# Patient Record
Sex: MALE | Race: WHITE | Employment: FULL TIME | URBAN - METROPOLITAN AREA
[De-identification: names, ages, dates, MRNs, and addresses within clinical notes are randomized per-mention and may not be internally consistent; named-entity substitution may affect disease eponyms.]

---

## 2017-08-14 DIAGNOSIS — I25.10 CORONARY ARTERY DISEASE INVOLVING NATIVE CORONARY ARTERY OF NATIVE HEART WITHOUT ANGINA PECTORIS: Primary | ICD-10-CM

## 2017-08-19 ENCOUNTER — HOSPITAL ENCOUNTER (OUTPATIENT)
Dept: OTHER | Age: 52
Discharge: HOME | End: 2017-08-19
Attending: INTERNAL MEDICINE | Admitting: INTERNAL MEDICINE

## 2017-08-19 LAB
A/G RATIO: 1.3 (ref 1.1–2.2)
ALBUMIN SERPL-MCNC: 3.9 G/DL (ref 3.4–5)
ALP BLD-CCNC: 65 U/L (ref 40–129)
ALT SERPL-CCNC: 33 U/L (ref 10–40)
ANION GAP SERPL CALCULATED.3IONS-SCNC: 15 MMOL/L (ref 3–16)
AST SERPL-CCNC: 22 U/L (ref 15–37)
BILIRUB SERPL-MCNC: 0.5 MG/DL (ref 0–1)
BUN BLDV-MCNC: 16 MG/DL (ref 7–20)
CALCIUM SERPL-MCNC: 9 MG/DL (ref 8.3–10.6)
CHLORIDE BLD-SCNC: 102 MMOL/L (ref 99–110)
CHOLESTEROL, TOTAL: 147 MG/DL (ref 0–199)
CO2: 25 MMOL/L (ref 21–32)
CREAT SERPL-MCNC: 0.9 MG/DL (ref 0.9–1.3)
GFR AFRICAN AMERICAN: >60
GFR NON-AFRICAN AMERICAN: >60
GLOBULIN: 3 G/DL
GLUCOSE BLD-MCNC: 120 MG/DL (ref 70–99)
HDLC SERPL-MCNC: 37 MG/DL (ref 40–60)
LDL CHOLESTEROL CALCULATED: 66 MG/DL
POTASSIUM SERPL-SCNC: 4 MMOL/L (ref 3.5–5.1)
SODIUM BLD-SCNC: 142 MMOL/L (ref 136–145)
TOTAL PROTEIN: 6.9 G/DL (ref 6.4–8.2)
TRIGL SERPL-MCNC: 218 MG/DL (ref 0–150)
VLDLC SERPL CALC-MCNC: 44 MG/DL

## 2017-08-20 LAB
ESTIMATED AVERAGE GLUCOSE: 131.2 MG/DL
HBA1C MFR BLD: 6.2 %

## 2017-08-21 ENCOUNTER — TELEPHONE (OUTPATIENT)
Dept: CARDIOLOGY CLINIC | Age: 52
End: 2017-08-21

## 2017-09-01 ENCOUNTER — OFFICE VISIT (OUTPATIENT)
Dept: CARDIOLOGY CLINIC | Age: 52
End: 2017-09-01

## 2017-09-01 VITALS
WEIGHT: 262 LBS | BODY MASS INDEX: 37.51 KG/M2 | SYSTOLIC BLOOD PRESSURE: 166 MMHG | OXYGEN SATURATION: 98 % | HEIGHT: 70 IN | HEART RATE: 88 BPM | DIASTOLIC BLOOD PRESSURE: 100 MMHG

## 2017-09-01 DIAGNOSIS — I25.10 CORONARY ARTERY DISEASE INVOLVING NATIVE CORONARY ARTERY OF NATIVE HEART WITHOUT ANGINA PECTORIS: ICD-10-CM

## 2017-09-01 DIAGNOSIS — I25.2 HISTORY OF NON-ST ELEVATION MYOCARDIAL INFARCTION (NSTEMI): ICD-10-CM

## 2017-09-01 DIAGNOSIS — I10 ESSENTIAL HYPERTENSION, BENIGN: Primary | ICD-10-CM

## 2017-09-01 PROCEDURE — 99214 OFFICE O/P EST MOD 30 MIN: CPT | Performed by: INTERNAL MEDICINE

## 2017-09-01 RX ORDER — HYDROCHLOROTHIAZIDE 25 MG/1
25 TABLET ORAL DAILY
Qty: 30 TABLET | Refills: 3 | Status: SHIPPED | OUTPATIENT
Start: 2017-09-01 | End: 2018-01-06 | Stop reason: SDUPTHER

## 2017-09-25 DIAGNOSIS — I25.10 CORONARY ARTERY DISEASE INVOLVING NATIVE CORONARY ARTERY OF NATIVE HEART WITHOUT ANGINA PECTORIS: ICD-10-CM

## 2017-09-25 RX ORDER — METOPROLOL SUCCINATE 100 MG/1
100 TABLET, EXTENDED RELEASE ORAL DAILY
Qty: 30 TABLET | Refills: 11 | Status: SHIPPED | OUTPATIENT
Start: 2017-09-25 | End: 2018-10-05 | Stop reason: SDUPTHER

## 2017-09-25 RX ORDER — ATORVASTATIN CALCIUM 80 MG/1
80 TABLET, FILM COATED ORAL DAILY
Qty: 30 TABLET | Refills: 11 | Status: SHIPPED | OUTPATIENT
Start: 2017-09-25 | End: 2018-10-05 | Stop reason: SDUPTHER

## 2017-09-25 RX ORDER — LOSARTAN POTASSIUM 100 MG/1
100 TABLET ORAL DAILY
Qty: 30 TABLET | Refills: 11 | Status: SHIPPED | OUTPATIENT
Start: 2017-09-25 | End: 2018-10-05 | Stop reason: SDUPTHER

## 2018-01-08 RX ORDER — HYDROCHLOROTHIAZIDE 25 MG/1
TABLET ORAL
Qty: 90 TABLET | Refills: 2 | Status: SHIPPED | OUTPATIENT
Start: 2018-01-08 | End: 2018-10-05 | Stop reason: SDUPTHER

## 2018-10-05 ENCOUNTER — TELEPHONE (OUTPATIENT)
Dept: CARDIOLOGY CLINIC | Age: 53
End: 2018-10-05

## 2018-10-05 DIAGNOSIS — I25.10 CORONARY ARTERY DISEASE INVOLVING NATIVE CORONARY ARTERY OF NATIVE HEART WITHOUT ANGINA PECTORIS: ICD-10-CM

## 2018-10-05 RX ORDER — LOSARTAN POTASSIUM 100 MG/1
100 TABLET ORAL DAILY
Qty: 30 TABLET | Refills: 0 | Status: SHIPPED | OUTPATIENT
Start: 2018-10-05 | End: 2018-10-06 | Stop reason: SDUPTHER

## 2018-10-05 RX ORDER — ASPIRIN 81 MG/1
81 TABLET, CHEWABLE ORAL DAILY
Qty: 30 TABLET | Refills: 0 | Status: SHIPPED | OUTPATIENT
Start: 2018-10-05 | End: 2018-10-26 | Stop reason: SDUPTHER

## 2018-10-05 RX ORDER — METOPROLOL SUCCINATE 100 MG/1
100 TABLET, EXTENDED RELEASE ORAL DAILY
Qty: 30 TABLET | Refills: 0 | Status: SHIPPED | OUTPATIENT
Start: 2018-10-05 | End: 2018-10-06 | Stop reason: SDUPTHER

## 2018-10-05 RX ORDER — HYDROCHLOROTHIAZIDE 25 MG/1
25 TABLET ORAL DAILY
Qty: 30 TABLET | Refills: 0 | Status: SHIPPED | OUTPATIENT
Start: 2018-10-05 | End: 2018-10-26 | Stop reason: SDUPTHER

## 2018-10-05 RX ORDER — ATORVASTATIN CALCIUM 80 MG/1
80 TABLET, FILM COATED ORAL DAILY
Qty: 30 TABLET | Refills: 0 | Status: SHIPPED | OUTPATIENT
Start: 2018-10-05 | End: 2018-10-06 | Stop reason: SDUPTHER

## 2018-10-06 DIAGNOSIS — I25.10 CORONARY ARTERY DISEASE INVOLVING NATIVE CORONARY ARTERY OF NATIVE HEART WITHOUT ANGINA PECTORIS: ICD-10-CM

## 2018-10-08 ENCOUNTER — TELEPHONE (OUTPATIENT)
Dept: CARDIOLOGY CLINIC | Age: 53
End: 2018-10-08

## 2018-10-08 RX ORDER — METOPROLOL SUCCINATE 100 MG/1
TABLET, EXTENDED RELEASE ORAL
Qty: 30 TABLET | Refills: 0 | Status: SHIPPED | OUTPATIENT
Start: 2018-10-08 | End: 2018-10-11 | Stop reason: SDUPTHER

## 2018-10-08 RX ORDER — ATORVASTATIN CALCIUM 80 MG/1
TABLET, FILM COATED ORAL
Qty: 30 TABLET | Refills: 0 | Status: SHIPPED | OUTPATIENT
Start: 2018-10-08 | End: 2018-10-26 | Stop reason: SDUPTHER

## 2018-10-08 RX ORDER — LOSARTAN POTASSIUM 100 MG/1
TABLET ORAL
Qty: 30 TABLET | Refills: 0 | Status: SHIPPED | OUTPATIENT
Start: 2018-10-08 | End: 2018-10-11 | Stop reason: SDUPTHER

## 2018-10-11 DIAGNOSIS — I25.10 CORONARY ARTERY DISEASE INVOLVING NATIVE CORONARY ARTERY OF NATIVE HEART WITHOUT ANGINA PECTORIS: ICD-10-CM

## 2018-10-11 RX ORDER — LOSARTAN POTASSIUM 100 MG/1
TABLET ORAL
Qty: 30 TABLET | Refills: 0 | Status: SHIPPED | OUTPATIENT
Start: 2018-10-11 | End: 2018-10-26 | Stop reason: SDUPTHER

## 2018-10-11 RX ORDER — HYDROCHLOROTHIAZIDE 25 MG/1
TABLET ORAL
Qty: 30 TABLET | Refills: 0 | Status: SHIPPED | OUTPATIENT
Start: 2018-10-11 | End: 2018-10-26 | Stop reason: SDUPTHER

## 2018-10-11 RX ORDER — METOPROLOL SUCCINATE 100 MG/1
TABLET, EXTENDED RELEASE ORAL
Qty: 30 TABLET | Refills: 0 | Status: SHIPPED | OUTPATIENT
Start: 2018-10-11 | End: 2018-10-26 | Stop reason: SDUPTHER

## 2018-10-20 ENCOUNTER — HOSPITAL ENCOUNTER (OUTPATIENT)
Age: 53
Discharge: HOME | End: 2018-10-20
Payer: COMMERCIAL

## 2018-10-22 ENCOUNTER — TELEPHONE (OUTPATIENT)
Dept: CARDIOLOGY CLINIC | Age: 53
End: 2018-10-22

## 2018-10-25 DIAGNOSIS — E78.2 MIXED HYPERLIPIDEMIA: ICD-10-CM

## 2018-10-25 DIAGNOSIS — Z13.1 DIABETES MELLITUS SCREENING: ICD-10-CM

## 2018-10-25 DIAGNOSIS — I25.10 CORONARY ARTERY DISEASE INVOLVING NATIVE CORONARY ARTERY OF NATIVE HEART WITHOUT ANGINA PECTORIS: Primary | ICD-10-CM

## 2018-10-26 ENCOUNTER — HOSPITAL ENCOUNTER (OUTPATIENT)
Age: 53
Discharge: HOME | End: 2018-10-26
Payer: COMMERCIAL

## 2018-10-26 ENCOUNTER — OFFICE VISIT (OUTPATIENT)
Dept: CARDIOLOGY CLINIC | Age: 53
End: 2018-10-26
Payer: COMMERCIAL

## 2018-10-26 ENCOUNTER — TELEPHONE (OUTPATIENT)
Dept: CARDIOLOGY CLINIC | Age: 53
End: 2018-10-26

## 2018-10-26 VITALS
DIASTOLIC BLOOD PRESSURE: 82 MMHG | OXYGEN SATURATION: 98 % | WEIGHT: 260.6 LBS | HEIGHT: 70 IN | BODY MASS INDEX: 37.31 KG/M2 | SYSTOLIC BLOOD PRESSURE: 128 MMHG | HEART RATE: 90 BPM

## 2018-10-26 DIAGNOSIS — Z13.1 DIABETES MELLITUS SCREENING: ICD-10-CM

## 2018-10-26 DIAGNOSIS — I10 ESSENTIAL HYPERTENSION, BENIGN: ICD-10-CM

## 2018-10-26 DIAGNOSIS — I25.10 CORONARY ARTERY DISEASE INVOLVING NATIVE CORONARY ARTERY OF NATIVE HEART WITHOUT ANGINA PECTORIS: Primary | ICD-10-CM

## 2018-10-26 DIAGNOSIS — E78.2 MIXED HYPERLIPIDEMIA: ICD-10-CM

## 2018-10-26 DIAGNOSIS — I25.10 CORONARY ARTERY DISEASE INVOLVING NATIVE CORONARY ARTERY OF NATIVE HEART WITHOUT ANGINA PECTORIS: ICD-10-CM

## 2018-10-26 LAB
A/G RATIO: 1.3 (ref 1.1–2.2)
ALBUMIN SERPL-MCNC: 4.2 G/DL (ref 3.4–5)
ALP BLD-CCNC: 72 U/L (ref 40–129)
ALT SERPL-CCNC: 30 U/L (ref 10–40)
ANION GAP SERPL CALCULATED.3IONS-SCNC: 16 MMOL/L (ref 3–16)
AST SERPL-CCNC: 24 U/L (ref 15–37)
BILIRUB SERPL-MCNC: 0.5 MG/DL (ref 0–1)
BUN BLDV-MCNC: 14 MG/DL (ref 7–20)
CALCIUM SERPL-MCNC: 9.9 MG/DL (ref 8.3–10.6)
CHLORIDE BLD-SCNC: 101 MMOL/L (ref 99–110)
CHOLESTEROL, TOTAL: 142 MG/DL (ref 0–199)
CO2: 26 MMOL/L (ref 21–32)
CREAT SERPL-MCNC: 1 MG/DL (ref 0.9–1.3)
ESTIMATED AVERAGE GLUCOSE: 142.7 MG/DL
GFR AFRICAN AMERICAN: >60
GFR NON-AFRICAN AMERICAN: >60
GLOBULIN: 3.2 G/DL
GLUCOSE BLD-MCNC: 135 MG/DL (ref 70–99)
HBA1C MFR BLD: 6.6 %
HDLC SERPL-MCNC: 35 MG/DL (ref 40–60)
LDL CHOLESTEROL CALCULATED: 75 MG/DL
POTASSIUM SERPL-SCNC: 3.7 MMOL/L (ref 3.5–5.1)
SODIUM BLD-SCNC: 143 MMOL/L (ref 136–145)
TOTAL PROTEIN: 7.4 G/DL (ref 6.4–8.2)
TRIGL SERPL-MCNC: 162 MG/DL (ref 0–150)
VLDLC SERPL CALC-MCNC: 32 MG/DL

## 2018-10-26 PROCEDURE — 83036 HEMOGLOBIN GLYCOSYLATED A1C: CPT

## 2018-10-26 PROCEDURE — 36415 COLL VENOUS BLD VENIPUNCTURE: CPT

## 2018-10-26 PROCEDURE — 99214 OFFICE O/P EST MOD 30 MIN: CPT | Performed by: INTERNAL MEDICINE

## 2018-10-26 PROCEDURE — 80053 COMPREHEN METABOLIC PANEL: CPT

## 2018-10-26 PROCEDURE — 80061 LIPID PANEL: CPT

## 2018-10-26 RX ORDER — ATORVASTATIN CALCIUM 80 MG/1
TABLET, FILM COATED ORAL
Qty: 90 TABLET | Refills: 3 | Status: SHIPPED | OUTPATIENT
Start: 2018-10-26 | End: 2019-10-14 | Stop reason: SDUPTHER

## 2018-10-26 RX ORDER — ASPIRIN 81 MG/1
81 TABLET, CHEWABLE ORAL DAILY
Qty: 90 TABLET | Refills: 3 | Status: SHIPPED | OUTPATIENT
Start: 2018-10-26 | End: 2019-10-14 | Stop reason: SDUPTHER

## 2018-10-26 RX ORDER — HYDROCHLOROTHIAZIDE 25 MG/1
25 TABLET ORAL DAILY
Qty: 90 TABLET | Refills: 3 | Status: SHIPPED | OUTPATIENT
Start: 2018-10-26 | End: 2019-10-14 | Stop reason: SDUPTHER

## 2018-10-26 RX ORDER — METOPROLOL SUCCINATE 100 MG/1
TABLET, EXTENDED RELEASE ORAL
Qty: 90 TABLET | Refills: 3 | Status: SHIPPED | OUTPATIENT
Start: 2018-10-26 | End: 2019-10-14 | Stop reason: SDUPTHER

## 2018-10-26 RX ORDER — LOSARTAN POTASSIUM 100 MG/1
TABLET ORAL
Qty: 90 TABLET | Refills: 3 | Status: SHIPPED | OUTPATIENT
Start: 2018-10-26 | End: 2019-10-14 | Stop reason: SDUPTHER

## 2018-10-26 NOTE — PROGRESS NOTES
not use drugs. Family History:  No evidence for sudden cardiac death or premature CAD    Home Medications:  Reviewed and are listed in nursing record. and/or listed below  Current Outpatient Prescriptions   Medication Sig Dispense Refill    metoprolol succinate (TOPROL XL) 100 MG extended release tablet TAKE 1 TABLET BY MOUTH ONE TIME A DAY  30 tablet 0    losartan (COZAAR) 100 MG tablet TAKE 1 TABLET BY MOUTH ONE TIME A DAY  30 tablet 0    atorvastatin (LIPITOR) 80 MG tablet TAKE 1 TABLET BY MOUTH ONE TIME A DAY  30 tablet 0    aspirin 81 MG chewable tablet Take 1 tablet by mouth daily (Patient taking differently: Take 81 mg by mouth ) 30 tablet 0    hydrochlorothiazide (HYDRODIURIL) 25 MG tablet Take 1 tablet by mouth daily 30 tablet 0     No current facility-administered medications for this visit. Allergies:  Patient has no known allergies. Review of Systems:   All 14 point review of symptoms completed.  Pertinent positives identified in the HPI, all other review of symptoms negative as below.     Review of Systems - History obtained from the patient  General ROS: negative for - chills, fever or night sweats  Psychological ROS: negative for - disorientation or hallucinations  Ophthalmic ROS: negative for - dry eyes, eye pain or loss of vision  ENT ROS: negative for - nasal discharge or sore throat  Allergy and Immunology ROS: negative for - hives or itchy/watery eyes  Hematological and Lymphatic ROS: negative for - jaundice or night sweats  Endocrine ROS: negative for - mood swings or temperature intolerance  Breast ROS: deferred  Respiratory ROS: negative for - hemoptysis or stridor  Cardiovascular ROS: negative for - chest pain, dyspnea on exertion or palpitations  Gastrointestinal ROS: no abdominal pain, change in bowel habits, or black or bloody stools  Genito-Urinary ROS: no dysuria, trouble voiding, or hematuria  Musculoskeletal ROS: negative for - gait disturbance, joint pain or joint PROT 8.3 08/06/2011    CALCIUM 9.9 10/26/2018    BILITOT 0.5 10/26/2018    ALKPHOS 72 10/26/2018    AST 24 10/26/2018    ALT 30 10/26/2018     PT/INR:    No components found for: PTPATIENT,  PTINR  Lab Results   Component Value Date    CKTOTAL 922 (H) 06/28/2011    TROPONINI 17.065 (HH) 06/29/2011     No components found for: CHLPL  Lab Results   Component Value Date    TRIG 162 (H) 10/26/2018    TRIG 218 (H) 08/19/2017    TRIG 144 08/20/2016     Lab Results   Component Value Date    HDL 35 (L) 10/26/2018    HDL 37 (L) 08/19/2017    HDL 37 (L) 08/20/2016     Lab Results   Component Value Date    LDLCALC 75 10/26/2018    LDLCALC 66 08/19/2017    LDLCALC 68 08/20/2016     Lab Results   Component Value Date    LABVLDL 32 10/26/2018    LABVLDL 44 08/19/2017    LABVLDL 29 08/20/2016     Lab Results   Component Value Date    ALT 30 10/26/2018    AST 24 10/26/2018    ALKPHOS 72 10/26/2018    BILITOT 0.5 10/26/2018           Assessment:  47 y.o.   1. Hypertension    ~BP: (128)/(82)    2. Coronary artery disease   ~mild CAD and normal LVEF 2011   ~stable  3. Alcohol use   ~ongoing  4. Hyperlipidemia   ~stable  5. Obesity   ~Body mass index is 37.39 kg/m². ~encourage weight loss with daily exercise and healthy eating    Plan:  1. I recommend that the patient continue their currently prescribed medications. Their drug modifiable risk factors appear to be well controlled. I will continue to address the need/dosing of medications in future visits. 2. The patient was seen for >25 minutes. >50% of the time was devoted to giving the patient detailed instructions instructions on addressing diet, regular exercise, weight control, smoking abstention, medication compliance, and stress minimization. The patient was provided written and verbal instructions regarding risk factor modification. 3. Lab orders placed for next year CMP, Lipids and Hemoglobin A1C.   4. Establish care with a primary care physician.     5. Follow up with

## 2019-10-14 DIAGNOSIS — I25.10 CORONARY ARTERY DISEASE INVOLVING NATIVE CORONARY ARTERY OF NATIVE HEART WITHOUT ANGINA PECTORIS: ICD-10-CM

## 2019-10-14 RX ORDER — HYDROCHLOROTHIAZIDE 25 MG/1
25 TABLET ORAL DAILY
Qty: 90 TABLET | Refills: 0 | Status: SHIPPED | OUTPATIENT
Start: 2019-10-14 | End: 2019-11-22 | Stop reason: SDUPTHER

## 2019-10-14 RX ORDER — ATORVASTATIN CALCIUM 80 MG/1
TABLET, FILM COATED ORAL
Qty: 90 TABLET | Refills: 0 | Status: SHIPPED | OUTPATIENT
Start: 2019-10-14 | End: 2019-11-22 | Stop reason: SDUPTHER

## 2019-10-14 RX ORDER — METOPROLOL SUCCINATE 100 MG/1
TABLET, EXTENDED RELEASE ORAL
Qty: 90 TABLET | Refills: 0 | Status: SHIPPED | OUTPATIENT
Start: 2019-10-14 | End: 2019-11-22 | Stop reason: SDUPTHER

## 2019-10-14 RX ORDER — LOSARTAN POTASSIUM 100 MG/1
TABLET ORAL
Qty: 90 TABLET | Refills: 0 | Status: SHIPPED | OUTPATIENT
Start: 2019-10-14 | End: 2019-11-22 | Stop reason: SDUPTHER

## 2019-10-14 RX ORDER — ASPIRIN 81 MG/1
81 TABLET, CHEWABLE ORAL DAILY
Qty: 90 TABLET | Refills: 0 | Status: SHIPPED | OUTPATIENT
Start: 2019-10-14 | End: 2019-11-22 | Stop reason: SDUPTHER

## 2019-11-14 DIAGNOSIS — I10 ESSENTIAL HYPERTENSION, BENIGN: Primary | ICD-10-CM

## 2019-11-14 DIAGNOSIS — I25.2 HISTORY OF NON-ST ELEVATION MYOCARDIAL INFARCTION (NSTEMI): ICD-10-CM

## 2019-11-16 ENCOUNTER — HOSPITAL ENCOUNTER (OUTPATIENT)
Age: 54
Discharge: HOME | End: 2019-11-16
Payer: COMMERCIAL

## 2019-11-16 DIAGNOSIS — I25.2 HISTORY OF NON-ST ELEVATION MYOCARDIAL INFARCTION (NSTEMI): ICD-10-CM

## 2019-11-16 DIAGNOSIS — I10 ESSENTIAL HYPERTENSION, BENIGN: ICD-10-CM

## 2019-11-16 LAB
A/G RATIO: 1.3 (ref 1.1–2.2)
ALBUMIN SERPL-MCNC: 4.2 G/DL (ref 3.4–5)
ALP BLD-CCNC: 76 U/L (ref 40–129)
ALT SERPL-CCNC: 28 U/L (ref 10–40)
ANION GAP SERPL CALCULATED.3IONS-SCNC: 13 MMOL/L (ref 3–16)
AST SERPL-CCNC: 24 U/L (ref 15–37)
BILIRUB SERPL-MCNC: 0.5 MG/DL (ref 0–1)
BUN BLDV-MCNC: 19 MG/DL (ref 7–20)
CALCIUM SERPL-MCNC: 9.9 MG/DL (ref 8.3–10.6)
CHLORIDE BLD-SCNC: 99 MMOL/L (ref 99–110)
CHOLESTEROL, TOTAL: 126 MG/DL (ref 0–199)
CO2: 27 MMOL/L (ref 21–32)
CREAT SERPL-MCNC: 1 MG/DL (ref 0.9–1.3)
GFR AFRICAN AMERICAN: >60
GFR NON-AFRICAN AMERICAN: >60
GLOBULIN: 3.3 G/DL
GLUCOSE BLD-MCNC: 143 MG/DL (ref 70–99)
HDLC SERPL-MCNC: 30 MG/DL (ref 40–60)
LDL CHOLESTEROL CALCULATED: 55 MG/DL
POTASSIUM SERPL-SCNC: 4 MMOL/L (ref 3.5–5.1)
SODIUM BLD-SCNC: 139 MMOL/L (ref 136–145)
TOTAL PROTEIN: 7.5 G/DL (ref 6.4–8.2)
TRIGL SERPL-MCNC: 205 MG/DL (ref 0–150)
VLDLC SERPL CALC-MCNC: 41 MG/DL

## 2019-11-16 PROCEDURE — 36415 COLL VENOUS BLD VENIPUNCTURE: CPT

## 2019-11-16 PROCEDURE — 80061 LIPID PANEL: CPT

## 2019-11-16 PROCEDURE — 83036 HEMOGLOBIN GLYCOSYLATED A1C: CPT

## 2019-11-16 PROCEDURE — 80053 COMPREHEN METABOLIC PANEL: CPT

## 2019-11-17 LAB
ESTIMATED AVERAGE GLUCOSE: 154.2 MG/DL
HBA1C MFR BLD: 7 %

## 2019-11-19 ENCOUNTER — TELEPHONE (OUTPATIENT)
Dept: CARDIOLOGY CLINIC | Age: 54
End: 2019-11-19

## 2019-11-22 ENCOUNTER — OFFICE VISIT (OUTPATIENT)
Dept: CARDIOLOGY CLINIC | Age: 54
End: 2019-11-22
Payer: COMMERCIAL

## 2019-11-22 VITALS
HEART RATE: 95 BPM | DIASTOLIC BLOOD PRESSURE: 78 MMHG | BODY MASS INDEX: 37.74 KG/M2 | WEIGHT: 263.6 LBS | OXYGEN SATURATION: 98 % | SYSTOLIC BLOOD PRESSURE: 110 MMHG | HEIGHT: 70 IN

## 2019-11-22 DIAGNOSIS — I10 ESSENTIAL HYPERTENSION, BENIGN: ICD-10-CM

## 2019-11-22 DIAGNOSIS — I25.10 CORONARY ARTERY DISEASE INVOLVING NATIVE CORONARY ARTERY OF NATIVE HEART WITHOUT ANGINA PECTORIS: Primary | ICD-10-CM

## 2019-11-22 PROCEDURE — 99214 OFFICE O/P EST MOD 30 MIN: CPT | Performed by: INTERNAL MEDICINE

## 2019-11-22 RX ORDER — ASPIRIN 81 MG/1
81 TABLET, CHEWABLE ORAL DAILY
Qty: 90 TABLET | Refills: 3 | Status: SHIPPED | OUTPATIENT
Start: 2019-11-22 | End: 2020-02-11

## 2019-11-22 RX ORDER — HYDROCHLOROTHIAZIDE 25 MG/1
25 TABLET ORAL DAILY
Qty: 90 TABLET | Refills: 3 | Status: SHIPPED | OUTPATIENT
Start: 2019-11-22 | End: 2020-02-11

## 2019-11-22 RX ORDER — ATORVASTATIN CALCIUM 80 MG/1
TABLET, FILM COATED ORAL
Qty: 90 TABLET | Refills: 3 | Status: SHIPPED | OUTPATIENT
Start: 2019-11-22 | End: 2020-02-11

## 2019-11-22 RX ORDER — LOSARTAN POTASSIUM 100 MG/1
TABLET ORAL
Qty: 90 TABLET | Refills: 3 | Status: SHIPPED | OUTPATIENT
Start: 2019-11-22 | End: 2020-02-11

## 2019-11-22 RX ORDER — METOPROLOL SUCCINATE 100 MG/1
TABLET, EXTENDED RELEASE ORAL
Qty: 90 TABLET | Refills: 3 | Status: SHIPPED | OUTPATIENT
Start: 2019-11-22 | End: 2020-02-11

## 2020-02-11 RX ORDER — ASPIRIN 81 MG/1
TABLET, CHEWABLE ORAL
Qty: 90 TABLET | Refills: 0 | Status: SHIPPED | OUTPATIENT
Start: 2020-02-11 | End: 2020-05-11

## 2020-02-11 RX ORDER — METOPROLOL SUCCINATE 100 MG/1
TABLET, EXTENDED RELEASE ORAL
Qty: 90 TABLET | Refills: 3 | Status: SHIPPED | OUTPATIENT
Start: 2020-02-11 | End: 2021-01-11

## 2020-02-11 RX ORDER — LOSARTAN POTASSIUM 100 MG/1
TABLET ORAL
Qty: 90 TABLET | Refills: 3 | Status: SHIPPED | OUTPATIENT
Start: 2020-02-11 | End: 2021-02-15 | Stop reason: SDUPTHER

## 2020-02-11 RX ORDER — HYDROCHLOROTHIAZIDE 25 MG/1
TABLET ORAL
Qty: 90 TABLET | Refills: 3 | Status: SHIPPED | OUTPATIENT
Start: 2020-02-11 | End: 2021-01-11

## 2020-02-11 RX ORDER — ATORVASTATIN CALCIUM 80 MG/1
TABLET, FILM COATED ORAL
Qty: 90 TABLET | Refills: 3 | Status: SHIPPED | OUTPATIENT
Start: 2020-02-11 | End: 2021-01-11

## 2020-05-11 RX ORDER — ASPIRIN 81 MG/1
TABLET, CHEWABLE ORAL
Qty: 90 TABLET | Refills: 1 | Status: SHIPPED | OUTPATIENT
Start: 2020-05-11 | End: 2021-02-15 | Stop reason: SDUPTHER

## 2020-08-17 ENCOUNTER — TELEPHONE (OUTPATIENT)
Dept: CARDIOLOGY CLINIC | Age: 55
End: 2020-08-17

## 2020-08-17 NOTE — TELEPHONE ENCOUNTER
Cozaar on back order. Pt also on HCTZ 25 mg daily, and Metoprolol 100 mg daily. Do you want to replace the Cozaar with something else or increase a current med?

## 2020-08-17 NOTE — TELEPHONE ENCOUNTER
Pharmacy calling the Rx losartan (COZAAR) 100 MG tablet all versions are on backorder need to know what to switch pt to?  Pls call to advise Thank you

## 2020-08-18 NOTE — TELEPHONE ENCOUNTER
Spoke with pts wife. They were able to get Cozaar filled at [facility]. They will call if they are unable to refill next month.

## 2021-01-10 DIAGNOSIS — I25.10 CORONARY ARTERY DISEASE INVOLVING NATIVE CORONARY ARTERY OF NATIVE HEART WITHOUT ANGINA PECTORIS: ICD-10-CM

## 2021-01-11 ENCOUNTER — TELEPHONE (OUTPATIENT)
Dept: CARDIOLOGY CLINIC | Age: 56
End: 2021-01-11

## 2021-01-11 DIAGNOSIS — I25.10 CORONARY ARTERY DISEASE INVOLVING NATIVE CORONARY ARTERY OF NATIVE HEART WITHOUT ANGINA PECTORIS: ICD-10-CM

## 2021-01-11 DIAGNOSIS — I10 ESSENTIAL HYPERTENSION, BENIGN: Primary | ICD-10-CM

## 2021-01-11 RX ORDER — METOPROLOL SUCCINATE 100 MG/1
TABLET, EXTENDED RELEASE ORAL
Qty: 30 TABLET | Refills: 0 | Status: SHIPPED | OUTPATIENT
Start: 2021-01-11 | End: 2021-02-09

## 2021-01-11 RX ORDER — ATORVASTATIN CALCIUM 80 MG/1
TABLET, FILM COATED ORAL
Qty: 30 TABLET | Refills: 0 | Status: SHIPPED | OUTPATIENT
Start: 2021-01-11 | End: 2021-02-09

## 2021-01-11 RX ORDER — HYDROCHLOROTHIAZIDE 25 MG/1
TABLET ORAL
Qty: 30 TABLET | Refills: 0 | Status: SHIPPED | OUTPATIENT
Start: 2021-01-11 | End: 2021-02-15 | Stop reason: SDUPTHER

## 2021-01-11 NOTE — TELEPHONE ENCOUNTER
1-11-21 No answer vm full at [phone]. Will try again at another time to schedule with vsp.  Also on recall list and we tried reaching out in November 2020

## 2021-01-12 NOTE — TELEPHONE ENCOUNTER
Attempted to contact pt to relay VSP message. No  and VM was not accepting messages. Will try again at another time. Lab orders placed in epic.

## 2021-01-25 ENCOUNTER — TELEPHONE (OUTPATIENT)
Dept: CARDIOLOGY CLINIC | Age: 56
End: 2021-01-25

## 2021-01-25 DIAGNOSIS — I10 ESSENTIAL HYPERTENSION, BENIGN: ICD-10-CM

## 2021-01-25 DIAGNOSIS — I25.10 CORONARY ARTERY DISEASE INVOLVING NATIVE CORONARY ARTERY OF NATIVE HEART WITHOUT ANGINA PECTORIS: Primary | ICD-10-CM

## 2021-02-08 DIAGNOSIS — I25.10 CORONARY ARTERY DISEASE INVOLVING NATIVE CORONARY ARTERY OF NATIVE HEART WITHOUT ANGINA PECTORIS: ICD-10-CM

## 2021-02-09 RX ORDER — METOPROLOL SUCCINATE 100 MG/1
TABLET, EXTENDED RELEASE ORAL
Qty: 30 TABLET | Refills: 0 | Status: SHIPPED | OUTPATIENT
Start: 2021-02-09 | End: 2021-02-15 | Stop reason: SDUPTHER

## 2021-02-09 RX ORDER — ATORVASTATIN CALCIUM 80 MG/1
TABLET, FILM COATED ORAL
Qty: 30 TABLET | Refills: 0 | Status: SHIPPED | OUTPATIENT
Start: 2021-02-09 | End: 2021-03-10

## 2021-02-15 ENCOUNTER — TELEPHONE (OUTPATIENT)
Dept: CARDIOLOGY CLINIC | Age: 56
End: 2021-02-15

## 2021-02-15 DIAGNOSIS — I25.10 CORONARY ARTERY DISEASE INVOLVING NATIVE CORONARY ARTERY OF NATIVE HEART WITHOUT ANGINA PECTORIS: ICD-10-CM

## 2021-02-15 RX ORDER — HYDROCHLOROTHIAZIDE 25 MG/1
TABLET ORAL
Qty: 30 TABLET | Refills: 1 | Status: SHIPPED | OUTPATIENT
Start: 2021-02-15 | End: 2021-04-27 | Stop reason: SDUPTHER

## 2021-02-15 RX ORDER — ASPIRIN 81 MG/1
TABLET, CHEWABLE ORAL
Qty: 90 TABLET | Refills: 1 | Status: SHIPPED | OUTPATIENT
Start: 2021-02-15 | End: 2021-06-23 | Stop reason: SDUPTHER

## 2021-02-15 RX ORDER — METOPROLOL SUCCINATE 100 MG/1
100 TABLET, EXTENDED RELEASE ORAL DAILY
Qty: 30 TABLET | Refills: 2 | Status: SHIPPED | OUTPATIENT
Start: 2021-02-15 | End: 2021-06-23 | Stop reason: SDUPTHER

## 2021-02-15 RX ORDER — LOSARTAN POTASSIUM 100 MG/1
100 TABLET ORAL DAILY
Qty: 30 TABLET | Refills: 3 | Status: SHIPPED | OUTPATIENT
Start: 2021-02-15 | End: 2021-06-23 | Stop reason: SDUPTHER

## 2021-02-15 NOTE — TELEPHONE ENCOUNTER
Pt wife called on answering service needs refill on heart medication didn't leave the name.  Pls call to advise Thank you

## 2021-03-06 ENCOUNTER — HOSPITAL ENCOUNTER (OUTPATIENT)
Age: 56
Discharge: HOME | End: 2021-03-06
Payer: COMMERCIAL

## 2021-03-06 DIAGNOSIS — I10 ESSENTIAL HYPERTENSION, BENIGN: ICD-10-CM

## 2021-03-06 DIAGNOSIS — I25.10 CORONARY ARTERY DISEASE INVOLVING NATIVE CORONARY ARTERY OF NATIVE HEART WITHOUT ANGINA PECTORIS: ICD-10-CM

## 2021-03-06 LAB
ALBUMIN SERPL-MCNC: 4.4 G/DL (ref 3.4–5)
ALP BLD-CCNC: 70 U/L (ref 40–129)
ALT SERPL-CCNC: 36 U/L (ref 10–40)
AST SERPL-CCNC: 29 U/L (ref 15–37)
BILIRUB SERPL-MCNC: 0.6 MG/DL (ref 0–1)
BILIRUBIN DIRECT: <0.2 MG/DL (ref 0–0.3)
BILIRUBIN, INDIRECT: NORMAL MG/DL (ref 0–1)
CHOLESTEROL, FASTING: 159 MG/DL (ref 0–199)
HDLC SERPL-MCNC: 33 MG/DL (ref 40–60)
LDL CHOLESTEROL CALCULATED: 92 MG/DL
TOTAL PROTEIN: 7.9 G/DL (ref 6.4–8.2)
TRIGLYCERIDE, FASTING: 169 MG/DL (ref 0–150)
VLDLC SERPL CALC-MCNC: 34 MG/DL

## 2021-03-06 PROCEDURE — 83036 HEMOGLOBIN GLYCOSYLATED A1C: CPT

## 2021-03-06 PROCEDURE — 80061 LIPID PANEL: CPT

## 2021-03-06 PROCEDURE — 80076 HEPATIC FUNCTION PANEL: CPT

## 2021-03-06 PROCEDURE — 36415 COLL VENOUS BLD VENIPUNCTURE: CPT

## 2021-03-07 LAB
ESTIMATED AVERAGE GLUCOSE: 171.4 MG/DL
HBA1C MFR BLD: 7.6 %

## 2021-03-08 ENCOUNTER — TELEPHONE (OUTPATIENT)
Dept: CARDIOLOGY CLINIC | Age: 56
End: 2021-03-08

## 2021-03-08 NOTE — TELEPHONE ENCOUNTER
----- Message from Dannielle Issa MD sent at 3/8/2021  8:25 AM EST -----  I reviewed the labs and see no major change or issues compared to prior studies. Continue current medical regimen. Call patient with results and recommendations.

## 2021-03-10 DIAGNOSIS — I25.10 CORONARY ARTERY DISEASE INVOLVING NATIVE CORONARY ARTERY OF NATIVE HEART WITHOUT ANGINA PECTORIS: ICD-10-CM

## 2021-03-10 RX ORDER — ATORVASTATIN CALCIUM 80 MG/1
TABLET, FILM COATED ORAL
Qty: 30 TABLET | Refills: 0 | Status: SHIPPED | OUTPATIENT
Start: 2021-03-10 | End: 2021-04-09

## 2021-04-09 DIAGNOSIS — I25.10 CORONARY ARTERY DISEASE INVOLVING NATIVE CORONARY ARTERY OF NATIVE HEART WITHOUT ANGINA PECTORIS: ICD-10-CM

## 2021-04-09 RX ORDER — ATORVASTATIN CALCIUM 80 MG/1
TABLET, FILM COATED ORAL
Qty: 30 TABLET | Refills: 3 | Status: SHIPPED | OUTPATIENT
Start: 2021-04-09 | End: 2021-04-19

## 2021-04-09 NOTE — TELEPHONE ENCOUNTER
Pt/pharamacy requesting atorvastatin 80 mg tab.  Pending script sent to [id number] 11/22/2019  Last labs-Lipids 3/6/2021  Next OV 5/14/2021

## 2021-04-16 DIAGNOSIS — I25.10 CORONARY ARTERY DISEASE INVOLVING NATIVE CORONARY ARTERY OF NATIVE HEART WITHOUT ANGINA PECTORIS: ICD-10-CM

## 2021-04-19 RX ORDER — ATORVASTATIN CALCIUM 80 MG/1
TABLET, FILM COATED ORAL
Qty: 30 TABLET | Refills: 0 | Status: SHIPPED | OUTPATIENT
Start: 2021-04-19 | End: 2021-06-23 | Stop reason: SDUPTHER

## 2021-04-27 DIAGNOSIS — I25.10 CORONARY ARTERY DISEASE INVOLVING NATIVE CORONARY ARTERY OF NATIVE HEART WITHOUT ANGINA PECTORIS: ICD-10-CM

## 2021-04-27 RX ORDER — HYDROCHLOROTHIAZIDE 25 MG/1
TABLET ORAL
Qty: 30 TABLET | Refills: 1 | Status: SHIPPED | OUTPATIENT
Start: 2021-04-27 | End: 2021-05-04 | Stop reason: SDUPTHER

## 2021-04-27 NOTE — TELEPHONE ENCOUNTER
Refill  hydroCHLOROthiazide (HYDRODIURIL) 25 MG tablet     [facility] [address] [place] [address], [address] [place] [address]     Pharmacy sts they have requested repeatedly with no response from our office.

## 2021-05-03 DIAGNOSIS — I25.10 CORONARY ARTERY DISEASE INVOLVING NATIVE CORONARY ARTERY OF NATIVE HEART WITHOUT ANGINA PECTORIS: ICD-10-CM

## 2021-05-03 NOTE — TELEPHONE ENCOUNTER
Refill sent to [facility] pharmacy.  Please send to [address] [place] [address] -  [phone]   hydroCHLOROthiazide (HYDRODIURIL) 25 MG tablet

## 2021-05-04 RX ORDER — HYDROCHLOROTHIAZIDE 25 MG/1
TABLET ORAL
Qty: 30 TABLET | Refills: 0 | Status: SHIPPED | OUTPATIENT
Start: 2021-05-04 | End: 2021-05-11

## 2021-05-09 DIAGNOSIS — I25.10 CORONARY ARTERY DISEASE INVOLVING NATIVE CORONARY ARTERY OF NATIVE HEART WITHOUT ANGINA PECTORIS: ICD-10-CM

## 2021-05-11 ENCOUNTER — TELEPHONE (OUTPATIENT)
Dept: CARDIOLOGY CLINIC | Age: 56
End: 2021-05-11

## 2021-05-11 RX ORDER — HYDROCHLOROTHIAZIDE 25 MG/1
TABLET ORAL
Qty: 30 TABLET | Refills: 0 | Status: SHIPPED | OUTPATIENT
Start: 2021-05-11 | End: 2021-06-11

## 2021-05-13 NOTE — PROGRESS NOTES
He reports current alcohol use. He reports that he does not use drugs. Family History:  No evidence for sudden cardiac death or premature CAD    Home Medications:  Reviewed and are listed in nursing record. and/or listed below  Current Outpatient Medications   Medication Sig Dispense Refill    hydroCHLOROthiazide (HYDRODIURIL) 25 MG tablet TAKE ONE TABLET BY MOUTH DAILY 30 tablet 0    atorvastatin (LIPITOR) 80 MG tablet TAKE ONE TABLET BY MOUTH DAILY 30 tablet 0    metoprolol succinate (TOPROL XL) 100 MG extended release tablet Take 1 tablet by mouth daily 30 tablet 2    losartan (COZAAR) 100 MG tablet Take 1 tablet by mouth daily 30 tablet 3    aspirin 81 MG chewable tablet CHEW AND SWALLOW 1 TABLET BY MOUTH ONE TIME A DAY 90 tablet 1     No current facility-administered medications for this visit. Allergies:  Patient has no known allergies. Review of Systems:   All 14 point review of symptoms completed.  Pertinent positives identified in the HPI, all other review of symptoms negative as below.     Review of Systems - History obtained from the patient  General ROS: negative for - chills, fever or night sweats  Psychological ROS: negative for - disorientation or hallucinations  Ophthalmic ROS: negative for - dry eyes, eye pain or loss of vision  ENT ROS: negative for - nasal discharge or sore throat  Allergy and Immunology ROS: negative for - hives or itchy/watery eyes  Hematological and Lymphatic ROS: negative for - jaundice or night sweats  Endocrine ROS: negative for - mood swings or temperature intolerance  Breast ROS: deferred  Respiratory ROS: negative for - hemoptysis or stridor  Gastrointestinal ROS: no abdominal pain, change in bowel habits, or black or bloody stools  Genito-Urinary ROS: no dysuria, trouble voiding, or hematuria  Musculoskeletal ROS: negative for - gait disturbance, joint pain or joint stiffness  Neurological ROS: negative for - seizures or speech problems Dermatological ROS: negative for - rash or skin lesion changes     Physical Examination:    Vitals:    05/14/21 0753   BP: 138/70   Pulse: 93   SpO2: 98%    Weight: 255 lb 8 oz (115.9 kg)     Wt Readings from Last 3 Encounters:   05/14/21 255 lb 8 oz (115.9 kg)   11/22/19 263 lb 9.6 oz (119.6 kg)   10/26/18 260 lb 9.6 oz (118.2 kg)         General Appearance:  Alert, cooperative, no distress, appears stated age   Head:  Normocephalic, without obvious abnormality, atraumatic   Eyes:  PERRL, conjunctiva/corneas clear       Nose: Nares normal, no drainage or sinus tenderness   Throat: Lips, mucosa, and tongue normal   Neck: Supple, symmetrical, trachea midline, no adenopathy, thyroid: not enlarged, symmetric, no tenderness/mass/nodules, no carotid bruit or JVD       Lungs:   Clear to auscultation bilaterally, respirations unlabored   Chest Wall:  No tenderness or deformity   Heart:  Regular rhythm and normal rate; S1, S2 are normal; no murmur noted; no rub or gallop   Abdomen:   Obese, soft, non-tender, bowel sounds active all four quadrants,  no masses, no organomegaly           Extremities: Extremities normal, atraumatic, no cyanosis or edema   Pulses: 2+ and symmetric   Skin: Skin color, texture, turgor normal, no rashes or lesions   Pysch: Normal mood and affect   Neurologic: Normal gross motor and sensory exam.         Labs  CBC:   Lab Results   Component Value Date    WBC 10.8 08/20/2016    RBC 5.25 08/20/2016    HGB 15.9 08/20/2016    HCT 48.0 08/20/2016    MCV 91.4 08/20/2016    RDW 13.8 08/20/2016     08/20/2016     CMP:    Lab Results   Component Value Date     11/16/2019    K 4.0 11/16/2019    CL 99 11/16/2019    CO2 27 11/16/2019    BUN 19 11/16/2019    CREATININE 1.0 11/16/2019    GFRAA >60 11/16/2019    GFRAA >60 08/06/2011    AGRATIO 1.3 11/16/2019    LABGLOM >60 11/16/2019    GLUCOSE 143 11/16/2019    PROT 7.9 03/06/2021    PROT 8.3 08/06/2011    CALCIUM 9.9 11/16/2019    BILITOT 0.6 gathered by the clinical support staff, while the remaining scribed note accurately describes my personal service to the patient. All questions and concerns were addressed to the patient/family. Alternatives to my treatment were discussed. The note was completed using EMR. Every effort was made to ensure accuracy; however, inadvertent computerized transcription errors may be present.     Ya Padilla MD, [address] [address], [address] office  [phone] [facility] office  [phone] [facility] office  5/14/2021  9:17 AM

## 2021-05-14 ENCOUNTER — OFFICE VISIT (OUTPATIENT)
Dept: CARDIOLOGY CLINIC | Age: 56
End: 2021-05-14
Payer: COMMERCIAL

## 2021-05-14 VITALS
WEIGHT: 255.5 LBS | HEART RATE: 93 BPM | HEIGHT: 70 IN | DIASTOLIC BLOOD PRESSURE: 70 MMHG | SYSTOLIC BLOOD PRESSURE: 138 MMHG | OXYGEN SATURATION: 98 % | BODY MASS INDEX: 36.58 KG/M2

## 2021-05-14 DIAGNOSIS — I25.10 CORONARY ARTERY DISEASE INVOLVING NATIVE CORONARY ARTERY OF NATIVE HEART WITHOUT ANGINA PECTORIS: Primary | ICD-10-CM

## 2021-05-14 DIAGNOSIS — I10 ESSENTIAL HYPERTENSION, BENIGN: ICD-10-CM

## 2021-05-14 DIAGNOSIS — E78.2 MIXED HYPERLIPIDEMIA: ICD-10-CM

## 2021-05-14 DIAGNOSIS — Z78.9 ALCOHOL USE: ICD-10-CM

## 2021-05-14 PROBLEM — F10.90 ALCOHOL USE: Status: ACTIVE | Noted: 2021-05-14

## 2021-05-14 PROCEDURE — 1036F TOBACCO NON-USER: CPT | Performed by: INTERNAL MEDICINE

## 2021-05-14 PROCEDURE — G8417 CALC BMI ABV UP PARAM F/U: HCPCS | Performed by: INTERNAL MEDICINE

## 2021-05-14 PROCEDURE — G8427 DOCREV CUR MEDS BY ELIG CLIN: HCPCS | Performed by: INTERNAL MEDICINE

## 2021-05-14 PROCEDURE — 99214 OFFICE O/P EST MOD 30 MIN: CPT | Performed by: INTERNAL MEDICINE

## 2021-05-14 PROCEDURE — 3017F COLORECTAL CA SCREEN DOC REV: CPT | Performed by: INTERNAL MEDICINE

## 2021-05-14 NOTE — PATIENT INSTRUCTIONS
Plan:  1. I recommend that the patient continue their currently prescribed medications. Their drug modifiable risk factors appear to be well controlled. I will continue to address the need/dosing of medications in future visits. 2. Continue with healthy lifestyle via exercise and diet  3.  Follow up in 1 year

## 2021-06-11 DIAGNOSIS — I25.10 CORONARY ARTERY DISEASE INVOLVING NATIVE CORONARY ARTERY OF NATIVE HEART WITHOUT ANGINA PECTORIS: ICD-10-CM

## 2021-06-11 RX ORDER — HYDROCHLOROTHIAZIDE 25 MG/1
TABLET ORAL
Qty: 90 TABLET | Refills: 3 | Status: SHIPPED | OUTPATIENT
Start: 2021-06-11 | End: 2022-06-20

## 2021-06-22 DIAGNOSIS — I25.10 CORONARY ARTERY DISEASE INVOLVING NATIVE CORONARY ARTERY OF NATIVE HEART WITHOUT ANGINA PECTORIS: ICD-10-CM

## 2021-06-22 NOTE — TELEPHONE ENCOUNTER
Refill  Requesting 90 day supply    atorvastatin (LIPITOR) 80 MG tablet   metoprolol succinate (TOPROL XL) 100 MG extended release tablet  losartan (COZAAR) 100 MG tablet   aspirin 81 MG chewable tablet     [facility] [address]

## 2021-06-23 RX ORDER — LOSARTAN POTASSIUM 100 MG/1
100 TABLET ORAL DAILY
Qty: 90 TABLET | Refills: 3 | Status: SHIPPED | OUTPATIENT
Start: 2021-06-23 | End: 2022-06-02

## 2021-06-23 RX ORDER — METOPROLOL SUCCINATE 100 MG/1
100 TABLET, EXTENDED RELEASE ORAL DAILY
Qty: 90 TABLET | Refills: 3 | Status: SHIPPED | OUTPATIENT
Start: 2021-06-23 | End: 2022-06-02

## 2021-06-23 RX ORDER — ASPIRIN 81 MG/1
TABLET, CHEWABLE ORAL
Qty: 90 TABLET | Refills: 3 | Status: SHIPPED | OUTPATIENT
Start: 2021-06-23 | End: 2022-07-26

## 2021-06-23 RX ORDER — ATORVASTATIN CALCIUM 80 MG/1
TABLET, FILM COATED ORAL
Qty: 90 TABLET | Refills: 3 | Status: SHIPPED | OUTPATIENT
Start: 2021-06-23 | End: 2022-07-21

## 2022-06-01 DIAGNOSIS — I25.10 CORONARY ARTERY DISEASE INVOLVING NATIVE CORONARY ARTERY OF NATIVE HEART WITHOUT ANGINA PECTORIS: ICD-10-CM

## 2022-06-02 ENCOUNTER — TELEPHONE (OUTPATIENT)
Dept: CARDIOLOGY CLINIC | Age: 57
End: 2022-06-02

## 2022-06-02 RX ORDER — LOSARTAN POTASSIUM 100 MG/1
TABLET ORAL
Qty: 90 TABLET | Refills: 0 | Status: SHIPPED | OUTPATIENT
Start: 2022-06-02 | End: 2022-06-20 | Stop reason: SDUPTHER

## 2022-06-02 RX ORDER — METOPROLOL SUCCINATE 100 MG/1
TABLET, EXTENDED RELEASE ORAL
Qty: 90 TABLET | Refills: 0 | Status: SHIPPED | OUTPATIENT
Start: 2022-06-02 | End: 2022-06-20 | Stop reason: SDUPTHER

## 2022-06-02 NOTE — TELEPHONE ENCOUNTER
6/2/22  at [phone] informing pt to call back to schedule annual vsp appt for next available, offer [facility] office.  If cannot reach pt after 2nd attempt will send letter

## 2022-06-06 NOTE — TELEPHONE ENCOUNTER
"Individual Psychotherapy (PhD/LCSW)    2/16/2017    Site:  [facility]         06/06/22-Spoke with Pt's wife Juancarlos Soto. Stated she would call back to [facility] to get pt scheduled. Either later today or tomorrow? ? depression, anxiety   · Why chosen therapy is appropriate versus another modality: relevant to diagnosis, patient responds to this modality  · Outcome monitoring methods: self-report, observation  · Therapeutic intervention type: insight oriented psychotherapy, supportive psychotherapy    Risk parameters:  Patient reports no suicidal ideation  Patient reports no homicidal ideation  Patient reports no self-injurious behavior  Patient reports no violent behavior    Verbal deficits: None    Patient's response to intervention:  The patient's response to intervention is accepting.    Progress toward goals and other mental status changes:  The patient's progress toward goals is limited.    Diagnosis:     ICD-10-CM ICD-9-CM   1. Generalized anxiety disorder F41.1 300.02   2. Major depressive disorder, recurrent episode, mild F33.0 296.31       Plan:  individual psychotherapy and medication management by physician    Return to clinic: 3 weeks    Length of Service (minutes): 45

## 2022-06-20 DIAGNOSIS — I25.10 CORONARY ARTERY DISEASE INVOLVING NATIVE CORONARY ARTERY OF NATIVE HEART WITHOUT ANGINA PECTORIS: ICD-10-CM

## 2022-06-20 RX ORDER — METOPROLOL SUCCINATE 100 MG/1
TABLET, EXTENDED RELEASE ORAL
Qty: 90 TABLET | Refills: 0 | Status: SHIPPED | OUTPATIENT
Start: 2022-06-20

## 2022-06-20 RX ORDER — HYDROCHLOROTHIAZIDE 25 MG/1
TABLET ORAL
Qty: 90 TABLET | Refills: 0 | Status: SHIPPED | OUTPATIENT
Start: 2022-06-20 | End: 2022-06-20 | Stop reason: SDUPTHER

## 2022-06-20 RX ORDER — LOSARTAN POTASSIUM 100 MG/1
TABLET ORAL
Qty: 90 TABLET | Refills: 0 | Status: SHIPPED | OUTPATIENT
Start: 2022-06-20

## 2022-06-20 RX ORDER — HYDROCHLOROTHIAZIDE 25 MG/1
TABLET ORAL
Qty: 90 TABLET | Refills: 0 | Status: SHIPPED | OUTPATIENT
Start: 2022-06-20

## 2022-06-20 NOTE — TELEPHONE ENCOUNTER
Medication Refill    Medication needing refilled: losartan (COZAAR)    Dosage of the medication: 100 MG tablet       How are you taking this medication (QD, BID, TID, QID, PRN): take one tablet by mouth daily     30 or 90 day supply called in: 30    When will you run out of your medication: Out Now     Which Pharmacy are we sending the medication to?:    [facility] [id number] [place], [place] - [address] [place] [address] [phone] [address], [address] [place] [address]   Phone:  [phone]  Fax:  [phone]

## 2022-06-20 NOTE — TELEPHONE ENCOUNTER
Medication Refill    Medication needing refilled:hydroCHLOROthiazide (HYDRODIURIL)     Dosage of the medication: 25 mg    How are you taking this medication (QD, BID, TID, QID, PRN):    30 or 90 day supply called in: 30    When will you run out of your medication: Out now     Which Pharmacy are we sending the medication to?:    [facility] [id number] [facility], [place] - [address] [place] [address] Number [phone]   [facility] [address], [facility]   Phone:  [phone]  Fax:  [phone]

## 2022-06-20 NOTE — TELEPHONE ENCOUNTER
Medication Refill    Medication needing refilled: metoprolol succinate (TOPROL XL)      Dosage of the medication:100 MG extended release tablet     How are you taking this medication (QD, BID, TID, QID, PRN):  Take on tablet by mouth daily  30 or 90 day supply called in: 30    When will you run out of your medication: Out now     Which Pharmacy are we sending the medication to?:    [facility] [id number] [place], [place] - [address] [place] [address] [phone]   [facility] [address]   Phone:  [phone]  Fax:  [phone]

## 2022-07-20 DIAGNOSIS — I25.10 CORONARY ARTERY DISEASE INVOLVING NATIVE CORONARY ARTERY OF NATIVE HEART WITHOUT ANGINA PECTORIS: ICD-10-CM

## 2022-07-21 RX ORDER — ATORVASTATIN CALCIUM 80 MG/1
TABLET, FILM COATED ORAL
Qty: 90 TABLET | Refills: 1 | Status: SHIPPED | OUTPATIENT
Start: 2022-07-21

## 2022-07-25 DIAGNOSIS — I25.10 CORONARY ARTERY DISEASE INVOLVING NATIVE CORONARY ARTERY OF NATIVE HEART WITHOUT ANGINA PECTORIS: ICD-10-CM

## 2022-07-26 RX ORDER — ASPIRIN 81 MG/1
TABLET, CHEWABLE ORAL
Qty: 90 TABLET | Refills: 1 | Status: SHIPPED | OUTPATIENT
Start: 2022-07-26

## 2022-11-28 ENCOUNTER — TELEPHONE (OUTPATIENT)
Dept: CARDIOLOGY CLINIC | Age: 57
End: 2022-11-28

## 2022-11-28 DIAGNOSIS — I25.10 CORONARY ARTERY DISEASE INVOLVING NATIVE CORONARY ARTERY OF NATIVE HEART WITHOUT ANGINA PECTORIS: ICD-10-CM

## 2022-11-28 DIAGNOSIS — Z79.899 MEDICATION MANAGEMENT: Primary | ICD-10-CM

## 2022-11-28 RX ORDER — LOSARTAN POTASSIUM 100 MG/1
TABLET ORAL
Qty: 30 TABLET | Refills: 1 | Status: SHIPPED | OUTPATIENT
Start: 2022-11-28

## 2022-11-28 RX ORDER — HYDROCHLOROTHIAZIDE 25 MG/1
TABLET ORAL
Qty: 30 TABLET | Refills: 1 | Status: SHIPPED | OUTPATIENT
Start: 2022-11-28

## 2022-11-28 RX ORDER — METOPROLOL SUCCINATE 100 MG/1
TABLET, EXTENDED RELEASE ORAL
Qty: 30 TABLET | Refills: 1 | Status: SHIPPED | OUTPATIENT
Start: 2022-11-28

## 2022-11-28 NOTE — TELEPHONE ENCOUNTER
Sorry for the confusion.  VSP doesn't have any availability and his schedule is not out past February so he doesn't have a \"first available\"

## 2022-11-28 NOTE — TELEPHONE ENCOUNTER
should be hearing soon from management in regards to VSP schedule. Will keep pt on board until VSP schedule is built for the rest of 2023.

## 2022-11-28 NOTE — TELEPHONE ENCOUNTER
11/28 VSP has no availability for the rest of the year except for at [place], pt does not wish to travel to [place]. Please advise.

## 2022-11-28 NOTE — TELEPHONE ENCOUNTER
11/28/22 LM at [phone] infromig pt to call back to use 12/29/22 at 845 or 945a ok'd per BRIEN Turner, please only make sure 9 pts total are on that date when scheduling. If pt cannot come that day please makte note in this encounter, and forward back to . We will then have to wait until vsp schedule is finalized if that is the case.

## 2022-11-28 NOTE — TELEPHONE ENCOUNTER
Please contact pt for yearly appt/med refills appt VSP, first available f fine to schedule. If the office can not get a hold of the patient for an appt please mail a letter to have them call and schedule. Thanks.

## 2022-12-15 NOTE — TELEPHONE ENCOUNTER
Called and spoke with wife. States patient is doing well just needs to schedule yearly office visit, scheduled for next avaliable she VU to time, date, and location of appt. Labs ordered per yearly labs VSP orders hemoglobin A1c, CMP, and lipid panel. Instructed to have patient be fasting, she VU.

## 2022-12-28 DIAGNOSIS — I25.10 CORONARY ARTERY DISEASE INVOLVING NATIVE CORONARY ARTERY OF NATIVE HEART WITHOUT ANGINA PECTORIS: ICD-10-CM

## 2022-12-29 RX ORDER — ATORVASTATIN CALCIUM 80 MG/1
TABLET, FILM COATED ORAL
Qty: 30 TABLET | Refills: 2 | Status: SHIPPED | OUTPATIENT
Start: 2022-12-29

## 2023-02-03 ENCOUNTER — HOSPITAL ENCOUNTER (OUTPATIENT)
Age: 58
Discharge: HOME | End: 2023-02-03
Payer: COMMERCIAL

## 2023-02-03 DIAGNOSIS — Z79.899 MEDICATION MANAGEMENT: ICD-10-CM

## 2023-02-03 LAB
A/G RATIO: 1.7 (ref 1.1–2.2)
ALBUMIN SERPL-MCNC: 4.3 G/DL (ref 3.4–5)
ALP BLD-CCNC: 81 U/L (ref 40–129)
ALT SERPL-CCNC: 25 U/L (ref 10–40)
ANION GAP SERPL CALCULATED.3IONS-SCNC: 15 MMOL/L (ref 3–16)
AST SERPL-CCNC: 26 U/L (ref 15–37)
BILIRUB SERPL-MCNC: 0.8 MG/DL (ref 0–1)
BUN BLDV-MCNC: 16 MG/DL (ref 7–20)
CALCIUM SERPL-MCNC: 9.8 MG/DL (ref 8.3–10.6)
CHLORIDE BLD-SCNC: 98 MMOL/L (ref 99–110)
CHOLESTEROL, TOTAL: 151 MG/DL (ref 0–199)
CO2: 24 MMOL/L (ref 21–32)
CREAT SERPL-MCNC: 1 MG/DL (ref 0.9–1.3)
ESTIMATED AVERAGE GLUCOSE: 220.2 MG/DL
GFR SERPL CREATININE-BSD FRML MDRD: >60 ML/MIN/{1.73_M2}
GLUCOSE BLD-MCNC: 204 MG/DL (ref 70–99)
HBA1C MFR BLD: 9.3 %
HDLC SERPL-MCNC: 29 MG/DL (ref 40–60)
LDL CHOLESTEROL CALCULATED: 68 MG/DL
POTASSIUM SERPL-SCNC: 4 MMOL/L (ref 3.5–5.1)
SODIUM BLD-SCNC: 137 MMOL/L (ref 136–145)
TOTAL PROTEIN: 6.9 G/DL (ref 6.4–8.2)
TRIGL SERPL-MCNC: 268 MG/DL (ref 0–150)
VLDLC SERPL CALC-MCNC: 54 MG/DL

## 2023-02-03 PROCEDURE — 83036 HEMOGLOBIN GLYCOSYLATED A1C: CPT

## 2023-02-03 PROCEDURE — 80053 COMPREHEN METABOLIC PANEL: CPT

## 2023-02-03 PROCEDURE — 80061 LIPID PANEL: CPT

## 2023-02-03 PROCEDURE — 36415 COLL VENOUS BLD VENIPUNCTURE: CPT

## 2023-02-08 ENCOUNTER — TELEPHONE (OUTPATIENT)
Dept: CARDIOLOGY CLINIC | Age: 58
End: 2023-02-08

## 2023-02-08 NOTE — TELEPHONE ENCOUNTER
Pts wife is requesting results of pts lab work. Pt had labs completed on 02/03/2023-they are in chart. Please advise.

## 2023-02-09 NOTE — TELEPHONE ENCOUNTER
Attempted to reach patient. No answer. Left VM with VSP message. Office number provided and appointment reminder left.

## 2023-02-16 ENCOUNTER — OFFICE VISIT (OUTPATIENT)
Dept: CARDIOLOGY CLINIC | Age: 58
End: 2023-02-16
Payer: COMMERCIAL

## 2023-02-16 VITALS
SYSTOLIC BLOOD PRESSURE: 186 MMHG | WEIGHT: 248 LBS | OXYGEN SATURATION: 96 % | HEIGHT: 70 IN | DIASTOLIC BLOOD PRESSURE: 90 MMHG | BODY MASS INDEX: 35.5 KG/M2 | HEART RATE: 105 BPM

## 2023-02-16 DIAGNOSIS — I10 ESSENTIAL HYPERTENSION, BENIGN: ICD-10-CM

## 2023-02-16 DIAGNOSIS — E78.2 MIXED HYPERLIPIDEMIA: ICD-10-CM

## 2023-02-16 DIAGNOSIS — I25.10 CORONARY ARTERY DISEASE INVOLVING NATIVE CORONARY ARTERY OF NATIVE HEART WITHOUT ANGINA PECTORIS: Primary | ICD-10-CM

## 2023-02-16 DIAGNOSIS — Z78.9 ALCOHOL USE: ICD-10-CM

## 2023-02-16 DIAGNOSIS — R94.31 ABNORMAL EKG: ICD-10-CM

## 2023-02-16 PROCEDURE — 3077F SYST BP >= 140 MM HG: CPT | Performed by: INTERNAL MEDICINE

## 2023-02-16 PROCEDURE — 99214 OFFICE O/P EST MOD 30 MIN: CPT | Performed by: INTERNAL MEDICINE

## 2023-02-16 PROCEDURE — 93000 ELECTROCARDIOGRAM COMPLETE: CPT | Performed by: INTERNAL MEDICINE

## 2023-02-16 PROCEDURE — G8417 CALC BMI ABV UP PARAM F/U: HCPCS | Performed by: INTERNAL MEDICINE

## 2023-02-16 PROCEDURE — 3080F DIAST BP >= 90 MM HG: CPT | Performed by: INTERNAL MEDICINE

## 2023-02-16 PROCEDURE — G8427 DOCREV CUR MEDS BY ELIG CLIN: HCPCS | Performed by: INTERNAL MEDICINE

## 2023-02-16 PROCEDURE — G8484 FLU IMMUNIZE NO ADMIN: HCPCS | Performed by: INTERNAL MEDICINE

## 2023-02-16 PROCEDURE — 1036F TOBACCO NON-USER: CPT | Performed by: INTERNAL MEDICINE

## 2023-02-16 PROCEDURE — 3017F COLORECTAL CA SCREEN DOC REV: CPT | Performed by: INTERNAL MEDICINE

## 2023-02-16 RX ORDER — METOPROLOL SUCCINATE 100 MG/1
TABLET, EXTENDED RELEASE ORAL
Qty: 30 TABLET | Refills: 11 | Status: SHIPPED | OUTPATIENT
Start: 2023-02-16

## 2023-02-16 RX ORDER — HYDROCHLOROTHIAZIDE 25 MG/1
TABLET ORAL
Qty: 30 TABLET | Refills: 11 | Status: SHIPPED | OUTPATIENT
Start: 2023-02-16

## 2023-02-16 RX ORDER — ATORVASTATIN CALCIUM 80 MG/1
TABLET, FILM COATED ORAL
Qty: 30 TABLET | Refills: 11 | Status: SHIPPED | OUTPATIENT
Start: 2023-02-16

## 2023-02-16 RX ORDER — LOSARTAN POTASSIUM 100 MG/1
TABLET ORAL
Qty: 30 TABLET | Refills: 11 | Status: SHIPPED | OUTPATIENT
Start: 2023-02-16

## 2023-02-16 RX ORDER — ASPIRIN 81 MG/1
TABLET, CHEWABLE ORAL
Qty: 30 TABLET | Refills: 11 | Status: SHIPPED | OUTPATIENT
Start: 2023-02-16

## 2023-02-16 RX ORDER — BLOOD PRESSURE TEST KIT
1 KIT MISCELLANEOUS ONCE
Qty: 1 KIT | Refills: 0 | Status: SHIPPED | OUTPATIENT
Start: 2023-02-16 | End: 2023-02-16

## 2023-02-16 NOTE — PROGRESS NOTES
[address] [facility]   Cardiovascular Evaluation    PATIENT: Miko Muñoz  DATE: 2023  MRN: [id number]  CSN: [id number]  : 1965    Primary Care Doctor: No primary care provider on file. Reason for evaluation:   1 Year Follow Up, Hypertension, Coronary Artery Disease, and Hyperlipidemia    Subjective:     History of present illness:   Miko Muñoz is a 65 y.o. male patient who presents for cardiology follow up for coronary artery disease, hypertension, and hyperlipidemia. He underwent left heart catheterization 2011 which revealed mild, diffuse epicardial coronary artery disease. Limited echocardiogram 11 EF 60%, mildly dilated left atrium. Today he states he feels he is drinking too much coffee. He reports he drinks alcohol on the weekends. He states he gets nervous to come to cardiologist. He reports he is feeling well, no changes since last office visit. Patient currently denies any weight gain, edema, palpitations, chest pain, shortness of breath, dizziness, and syncope. He is taking medications as prescribed, tolerating well. He states he has made dietary changes to eat more vegetables including broccoli and cut out red meats. He drinks a lot of diet coke. Patient Active Problem List   Diagnosis    Other chest pain    Essential hypertension, benign    History of non-ST elevation myocardial infarction (NSTEMI)    CAD (coronary artery disease)    Mixed hyperlipidemia    Alcohol use    Abnormal EKG     Cardiac Testing: I have reviewed the findings below. EKG:   ECHO: CONCLUSION-  1. Limited 2D echocardiogram.  2. Normal left ventricular chamber size with preserved left  ventricular function and ejection fraction of 60%. 3. Diastolic noncompliance. 4. Mildly dilated left atrium. 5. Borderline dilated right ventricle with normal right  ventricular systolic pressures.     LEOLA/norman  D- 11  T- 11    Read By- Lamine BARTHOLOMEW    STRESS TEST: CATH:   06/29/2011   PROCEDURE PERFORMED:   1. Selective coronary angiography. 2. Left heart catheterization. 3. Left ventriculography. 4. Mynx device deployment. ANGIOGRAPHIC FINDINGS:   1. Mild diffuse epicardial coronary artery disease. 2. Preserved left ventricular function. 3. Mild systemic hypertension. BYPASS:  VASCULAR:      Past Medical History:   has no past medical history on file. Surgical History:   has a past surgical history that includes Cholecystectomy. Social History:   reports that he has never smoked. He has been exposed to tobacco smoke. He has never used smokeless tobacco. He reports current alcohol use. He reports that he does not use drugs. Family History:  No evidence for sudden cardiac death or premature CAD    Home Medications:  Reviewed and are listed in nursing record. and/or listed below  Current Outpatient Medications   Medication Sig Dispense Refill    atorvastatin (LIPITOR) 80 MG tablet Take one tablet by mouth daily 30 tablet 11    metoprolol succinate (TOPROL XL) 100 MG extended release tablet Take one tablet by mouth daily 30 tablet 11    losartan (COZAAR) 100 MG tablet Take one tablet by mouth daily 30 tablet 11    hydroCHLOROthiazide (HYDRODIURIL) 25 MG tablet TAKE ONE TABLET BY MOUTH DAILY 30 tablet 11    aspirin 81 MG chewable tablet CHEW AND SWALLOW 1 TABLET BY MOUTH DAILY 30 tablet 11    Blood Pressure KIT 1 kit by Does not apply route once for 1 dose 1 kit 0     No current facility-administered medications for this visit. Allergies:  Patient has no known allergies. Review of Systems:   All 14 point review of symptoms completed. Pertinent positives identified in the HPI, all other review of symptoms negative as below.      Review of Systems - History obtained from the patient  General ROS: negative for - chills, fever or night sweats  Psychological ROS: negative for - disorientation or hallucinations  Ophthalmic ROS: negative for - dry eyes, eye pain or loss of vision  ENT ROS: negative for - nasal discharge or sore throat  Allergy and Immunology ROS: negative for - hives or itchy/watery eyes  Hematological and Lymphatic ROS: negative for - jaundice or night sweats  Endocrine ROS: negative for - mood swings or temperature intolerance  Breast ROS: deferred  Respiratory ROS: negative for - hemoptysis or stridor  Gastrointestinal ROS: no abdominal pain, change in bowel habits, or black or bloody stools  Genito-Urinary ROS: no dysuria, trouble voiding, or hematuria  Musculoskeletal ROS: negative for - gait disturbance, joint pain or joint stiffness  Neurological ROS: negative for - seizures or speech problems  Dermatological ROS: negative for - rash or skin lesion changes     Physical Examination:    Vitals:    02/16/23 1453   BP: (!) 186/90   Pulse:    SpO2:       Weight: 248 lb (112.5 kg)     Wt Readings from Last 3 Encounters:   02/16/23 248 lb (112.5 kg)   05/14/21 255 lb 8 oz (115.9 kg)   11/22/19 263 lb 9.6 oz (119.6 kg)         General Appearance:  Alert, cooperative, no distress, appears stated age   Head:  Normocephalic, without obvious abnormality, atraumatic   Eyes:  PERRL, conjunctiva/corneas clear       Nose: Nares normal, no drainage or sinus tenderness   Throat: Lips, mucosa, and tongue normal   Neck: Supple, symmetrical, trachea midline, no adenopathy, thyroid: not enlarged, symmetric, no tenderness/mass/nodules, no carotid bruit or JVD       Lungs:   Clear to auscultation bilaterally, respirations unlabored   Chest Wall:  No tenderness or deformity   Heart:  Regular rhythm and normal rate; S1, S2 are normal; no murmur noted; no rub or gallop   Abdomen:   Obese, soft, non-tender, bowel sounds active all four quadrants,  no masses, no organomegaly           Extremities: Extremities normal, atraumatic, no cyanosis or edema   Pulses: 2+ and symmetric   Skin: Skin color, texture, turgor normal, no rashes or lesions   Pysch: Normal mood and affect   Neurologic: Normal gross motor and sensory exam.         Labs  CBC:   Lab Results   Component Value Date/Time    WBC 10.8 08/20/2016 07:32 AM    RBC 5.25 08/20/2016 07:32 AM    HGB 15.9 08/20/2016 07:32 AM    HCT 48.0 08/20/2016 07:32 AM    MCV 91.4 08/20/2016 07:32 AM    RDW 13.8 08/20/2016 07:32 AM     08/20/2016 07:32 AM     CMP:    Lab Results   Component Value Date/Time     02/03/2023 07:16 AM    K 4.0 02/03/2023 07:16 AM    CL 98 02/03/2023 07:16 AM    CO2 24 02/03/2023 07:16 AM    BUN 16 02/03/2023 07:16 AM    CREATININE 1.0 02/03/2023 07:16 AM    GFRAA >60 11/16/2019 06:25 AM    GFRAA >60 08/06/2011 07:20 AM    AGRATIO 1.7 02/03/2023 07:16 AM    LABGLOM >60 02/03/2023 07:16 AM    GLUCOSE 204 02/03/2023 07:16 AM    PROT 6.9 02/03/2023 07:16 AM    PROT 8.3 08/06/2011 07:20 AM    CALCIUM 9.8 02/03/2023 07:16 AM    BILITOT 0.8 02/03/2023 07:16 AM    ALKPHOS 81 02/03/2023 07:16 AM    AST 26 02/03/2023 07:16 AM    ALT 25 02/03/2023 07:16 AM     PT/INR:    No components found for: PTPATIENT,  PTINR  Lab Results   Component Value Date    CKTOTAL 922 (H) 06/28/2011    TROPONINI 17.065 (HH) 06/29/2011     No components found for: CHLPL  Lab Results   Component Value Date    TRIG 268 (H) 02/03/2023    TRIG 205 (H) 11/16/2019    TRIG 162 (H) 10/26/2018     Lab Results   Component Value Date    HDL 29 (L) 02/03/2023    HDL 33 (L) 03/06/2021    HDL 30 (L) 11/16/2019     Lab Results   Component Value Date    LDLCALC 68 02/03/2023    LDLCALC 92 03/06/2021    LDLCALC 55 11/16/2019     Lab Results   Component Value Date    LABVLDL 54 02/03/2023    LABVLDL 34 03/06/2021    LABVLDL 41 11/16/2019     Lab Results   Component Value Date    ALT 25 02/03/2023    AST 26 02/03/2023    ALKPHOS 81 02/03/2023    BILITOT 0.8 02/03/2023           Assessment:  65 y.o. Abnormal EKG with new LBBB  2. Hypertension    ~BP: (186-188)/(90-92)    3. Coronary artery disease   ~mild CAD and normal LVEF 2011   ~stable  4. Alcohol use   ~better  5. Hyperlipidemia   ~stable    Plan:  Order echocardiogram ~ hypertension and abnormal EKG  ~A test that records movement of your heart valves and chambers by ultrasound. Evaluates heart valves, any chamber enlargement, abnormal openings, or any fluid in the sac surrounding the heart. Recommend physical activity regimen ~ walking regimen okay to proceed with. Recommend modification to diet ~ okay to drink water, green tea, or coffee (in moderation)  Discussed elevated blood pressure today in office ~ possible relation to being under stress.   ~    Recommend monitoring blood pressure at home call office if consistently elevated over 140/90.   5.   Recommend re-establishment with primary care provider ~ elevated blood glucose   6. Follow up 3 months. Scribe's attestation: This note was scribed in the presence of Deirdre Ramirez by Enma Chacko RN     I, Dr. Rosemary Neely, personally performed the services described in this documentation, as scribed by the above signed scribe in my presence. It is both accurate and complete to my knowledge. I agree with the details independently gathered by the clinical support staff, while the remaining scribed note accurately describes my personal service to the patient. Medical Decision Making: The following items were considered in medical decision making:  Independent review of images  Review / order clinical lab tests  Review / order radiology tests  Decision to obtain old records  Review and summation of old records as accessed through [facility] (a summary of my findings in these old records)      Time Based Itemization  A total of 30 minutes was spent on today's patient encounter.   If applicable, non-patient-facing activities:  (X)Preparing to see the patient and reviewing records  (X) Individual interpretation of results  ( ) Discussion or coordination of care with other health care professionals  (X) Ordering of unique tests, medications, or procedures  (X) Documentation within the EHR              All questions and concerns were addressed to the patient/family. Alternatives to my treatment were discussed. The note was completed using EMR. Every effort was made to ensure accuracy; however, inadvertent computerized transcription errors may be present.     Taylor Ochoa MD, [address], [address] office  [phone] [facility] office  [phone] [place] office  2/16/2023  3:41 PM

## 2023-02-16 NOTE — PATIENT INSTRUCTIONS
Your provider has ordered testing for further evaluation. An order/prescription has been included in your paper work. To schedule outpatient testing, contact Central Scheduling by calling [email or web address] ([phone]). Plan:  Order echocardiogram ~ hypertension and abnormal EKG  ~A test that records movement of your heart valves and chambers by ultrasound. Evaluates heart valves, any chamber enlargement, abnormal openings, or any fluid in the sac surrounding the heart. Recommend physical activity regimen ~ walking regimen okay to proceed with. Recommend modification to diet ~ okay to drink water, green tea, or coffee (in moderation)  Discussed elevated blood pressure today in office ~ possible relation to being under stress.   ~    Recommend monitoring blood pressure at home call office if consistently elevated over 140/90.   5.   Recommend re-establishment with primary care provider ~ elevated blood glucose   6.    Follow up in 3 months

## 2023-02-19 ENCOUNTER — HOSPITAL ENCOUNTER (EMERGENCY)
Age: 58
Discharge: HOME | End: 2023-02-20
Payer: COMMERCIAL

## 2023-02-19 ENCOUNTER — APPOINTMENT (OUTPATIENT)
Dept: CT IMAGING | Age: 58
End: 2023-02-19
Payer: COMMERCIAL

## 2023-02-19 ENCOUNTER — APPOINTMENT (OUTPATIENT)
Dept: GENERAL RADIOLOGY | Age: 58
End: 2023-02-19
Payer: COMMERCIAL

## 2023-02-19 DIAGNOSIS — J01.90 ACUTE SINUSITIS, RECURRENCE NOT SPECIFIED, UNSPECIFIED LOCATION: ICD-10-CM

## 2023-02-19 DIAGNOSIS — U07.1 COVID: Primary | ICD-10-CM

## 2023-02-19 LAB
A/G RATIO: 1.3 (ref 1.1–2.2)
ALBUMIN SERPL-MCNC: 4.4 G/DL (ref 3.4–5)
ALP BLD-CCNC: 86 U/L (ref 40–129)
ALT SERPL-CCNC: 35 U/L (ref 10–40)
ANION GAP SERPL CALCULATED.3IONS-SCNC: 19 MMOL/L (ref 3–16)
AST SERPL-CCNC: 29 U/L (ref 15–37)
BASOPHILS ABSOLUTE: 0.1 K/UL (ref 0–0.2)
BASOPHILS RELATIVE PERCENT: 0.6 %
BILIRUB SERPL-MCNC: 0.4 MG/DL (ref 0–1)
BILIRUBIN URINE: NEGATIVE
BLOOD, URINE: NEGATIVE
BUN BLDV-MCNC: 20 MG/DL (ref 7–20)
CALCIUM SERPL-MCNC: 10.1 MG/DL (ref 8.3–10.6)
CHLORIDE BLD-SCNC: 93 MMOL/L (ref 99–110)
CLARITY: CLEAR
CO2: 21 MMOL/L (ref 21–32)
COLOR: YELLOW
CREAT SERPL-MCNC: 1.3 MG/DL (ref 0.9–1.3)
EOSINOPHILS ABSOLUTE: 0.1 K/UL (ref 0–0.6)
EOSINOPHILS RELATIVE PERCENT: 0.5 %
GFR SERPL CREATININE-BSD FRML MDRD: >60 ML/MIN/{1.73_M2}
GLUCOSE BLD-MCNC: 264 MG/DL (ref 70–99)
GLUCOSE URINE: NEGATIVE MG/DL
HCT VFR BLD CALC: 47.7 % (ref 40.5–52.5)
HEMOGLOBIN: 16.1 G/DL (ref 13.5–17.5)
INFLUENZA A: NOT DETECTED
INFLUENZA B: NOT DETECTED
KETONES, URINE: NEGATIVE MG/DL
LACTIC ACID: 2.6 MMOL/L (ref 0.4–2)
LACTIC ACID: 3.2 MMOL/L (ref 0.4–2)
LEUKOCYTE ESTERASE, URINE: ABNORMAL
LYMPHOCYTES ABSOLUTE: 4.5 K/UL (ref 1–5.1)
LYMPHOCYTES RELATIVE PERCENT: 29.2 %
MAGNESIUM: 2.1 MG/DL (ref 1.8–2.4)
MCH RBC QN AUTO: 29.7 PG (ref 26–34)
MCHC RBC AUTO-ENTMCNC: 33.8 G/DL (ref 31–36)
MCV RBC AUTO: 87.8 FL (ref 80–100)
MICROSCOPIC EXAMINATION: YES
MONOCYTES ABSOLUTE: 1.1 K/UL (ref 0–1.3)
MONOCYTES RELATIVE PERCENT: 7 %
NEUTROPHILS ABSOLUTE: 9.6 K/UL (ref 1.7–7.7)
NEUTROPHILS RELATIVE PERCENT: 62.7 %
NITRITE, URINE: NEGATIVE
PDW BLD-RTO: 13.7 % (ref 12.4–15.4)
PH UA: 6 (ref 5–8)
PLATELET # BLD: 262 K/UL (ref 135–450)
PMV BLD AUTO: 9.5 FL (ref 5–10.5)
POTASSIUM REFLEX MAGNESIUM: 3.7 MMOL/L (ref 3.5–5.1)
PRO-BNP: 73 PG/ML (ref 0–124)
PROTEIN UA: NEGATIVE MG/DL
RBC # BLD: 5.43 M/UL (ref 4.2–5.9)
RBC UA: NORMAL /HPF (ref 0–4)
SARS-COV-2 RNA, RT PCR: DETECTED
SODIUM BLD-SCNC: 133 MMOL/L (ref 136–145)
SPECIFIC GRAVITY UA: <=1.005 (ref 1–1.03)
TOTAL PROTEIN: 7.8 G/DL (ref 6.4–8.2)
TROPONIN: <0.01 NG/ML
URINE TYPE: ABNORMAL
UROBILINOGEN, URINE: 0.2 E.U./DL
WBC # BLD: 15.3 K/UL (ref 4–11)
WBC UA: NORMAL /HPF (ref 0–5)

## 2023-02-19 PROCEDURE — 83605 ASSAY OF LACTIC ACID: CPT

## 2023-02-19 PROCEDURE — 80053 COMPREHEN METABOLIC PANEL: CPT

## 2023-02-19 PROCEDURE — 87636 SARSCOV2 & INF A&B AMP PRB: CPT

## 2023-02-19 PROCEDURE — 93005 ELECTROCARDIOGRAM TRACING: CPT | Performed by: PHYSICIAN ASSISTANT

## 2023-02-19 PROCEDURE — 85025 COMPLETE CBC W/AUTO DIFF WBC: CPT

## 2023-02-19 PROCEDURE — 83880 ASSAY OF NATRIURETIC PEPTIDE: CPT

## 2023-02-19 PROCEDURE — 2580000003 HC RX 258: Performed by: PHYSICIAN ASSISTANT

## 2023-02-19 PROCEDURE — 83735 ASSAY OF MAGNESIUM: CPT

## 2023-02-19 PROCEDURE — 71045 X-RAY EXAM CHEST 1 VIEW: CPT

## 2023-02-19 PROCEDURE — 71260 CT THORAX DX C+: CPT | Performed by: PHYSICIAN ASSISTANT

## 2023-02-19 PROCEDURE — 81001 URINALYSIS AUTO W/SCOPE: CPT

## 2023-02-19 PROCEDURE — 84484 ASSAY OF TROPONIN QUANT: CPT

## 2023-02-19 PROCEDURE — 6360000004 HC RX CONTRAST MEDICATION: Performed by: PHYSICIAN ASSISTANT

## 2023-02-19 PROCEDURE — 96360 HYDRATION IV INFUSION INIT: CPT

## 2023-02-19 PROCEDURE — 99285 EMERGENCY DEPT VISIT HI MDM: CPT

## 2023-02-19 RX ORDER — 0.9 % SODIUM CHLORIDE 0.9 %
1000 INTRAVENOUS SOLUTION INTRAVENOUS ONCE
Status: COMPLETED | OUTPATIENT
Start: 2023-02-19 | End: 2023-02-19

## 2023-02-19 RX ADMIN — IOPAMIDOL 75 ML: 755 INJECTION, SOLUTION INTRAVENOUS at 21:37

## 2023-02-19 RX ADMIN — SODIUM CHLORIDE 1000 ML: 9 INJECTION, SOLUTION INTRAVENOUS at 20:01

## 2023-02-20 VITALS
SYSTOLIC BLOOD PRESSURE: 128 MMHG | RESPIRATION RATE: 19 BRPM | DIASTOLIC BLOOD PRESSURE: 94 MMHG | OXYGEN SATURATION: 96 % | HEART RATE: 99 BPM | TEMPERATURE: 98.4 F

## 2023-02-20 LAB
EKG ATRIAL RATE: 118 BPM
EKG DIAGNOSIS: NORMAL
EKG P-R INTERVAL: 136 MS
EKG Q-T INTERVAL: 392 MS
EKG QRS DURATION: 156 MS
EKG QTC CALCULATION (BAZETT): 549 MS
EKG R AXIS: -62 DEGREES
EKG T AXIS: 84 DEGREES
EKG VENTRICULAR RATE: 118 BPM

## 2023-02-20 PROCEDURE — 6370000000 HC RX 637 (ALT 250 FOR IP): Performed by: PHYSICIAN ASSISTANT

## 2023-02-20 PROCEDURE — 93010 ELECTROCARDIOGRAM REPORT: CPT | Performed by: INTERNAL MEDICINE

## 2023-02-20 RX ORDER — AMOXICILLIN AND CLAVULANATE POTASSIUM 875; 125 MG/1; MG/1
1 TABLET, FILM COATED ORAL ONCE
Status: COMPLETED | OUTPATIENT
Start: 2023-02-20 | End: 2023-02-20

## 2023-02-20 RX ORDER — AMOXICILLIN AND CLAVULANATE POTASSIUM 875; 125 MG/1; MG/1
1 TABLET, FILM COATED ORAL 2 TIMES DAILY
Qty: 20 TABLET | Refills: 0 | Status: SHIPPED | OUTPATIENT
Start: 2023-02-20 | End: 2023-03-02

## 2023-02-20 RX ORDER — FLUTICASONE PROPIONATE 50 MCG
2 SPRAY, SUSPENSION (ML) NASAL DAILY
Qty: 16 G | Refills: 0 | Status: SHIPPED | OUTPATIENT
Start: 2023-02-20

## 2023-02-20 RX ADMIN — AMOXICILLIN AND CLAVULANATE POTASSIUM 1 TABLET: 875; 125 TABLET, FILM COATED ORAL at 00:15

## 2023-02-20 NOTE — ED PROVIDER NOTES
[address] [facility]  ED  Emergency Department Encounter    Patient Name: Jeff Ruffin  MRN: [id number]  YOB: 1965  Date of Evaluation: 2/19/2023  Provider: No primary care provider on file. Note Started: 8:07 PM EST 2/19/23    CHIEF COMPLAINT  Back Pain and Anxiety (States beginning Thursday, saw PCP. Told had \"changes on EKGs\" which made patient nervous. Has been anxious since. Denies CP and SOB)    SHARED SERVICE VISIT  Evaluated by AMBERLY. My supervising physician was available for consultation. HISTORY OF PRESENT ILLNESS  Jeff Ruffin is a 65 y.o. male who presents to the ED several day history of sensation of racing heart and feeling anxious. States that onset was Thursday as he was going to his cardiologist.  States that he became anxious. Reports racing heart sensation. He has had no headache, lightheadedness, dizziness or confusion. No chest pain or shortness of breath. Non-smoker. Denies cough or congestion. No fevers or chills. No pain with deep breaths. No leg pain or swelling. No recent travel, trauma or surgery. He has had no abdominal discomfort. Denies nausea, vomiting or diarrhea. Has never been diagnosed with anxiety. He denies hallucinations or delusions. No homicidal or suicidal ideations. No other complaints, modifying factors or associated symptoms. Nursing notes reviewed were all reviewed and agreed with or any disagreements were addressed in the HPI. PMH:  History reviewed. No pertinent past medical history.     Surgical History:  Past Surgical History:   Procedure Laterality Date    CHOLECYSTECTOMY       Family History:  Family History   Problem Relation Age of Onset    High Blood Pressure Mother     High Cholesterol Mother     Cancer Father     High Blood Pressure Father     Stroke Father     High Blood Pressure Brother      Social History:  Social History     Socioeconomic History    Marital status:      Spouse name: Not on file    Number of children: Not on file    Years of education: Not on file    Highest education level: Not on file   Occupational History    Not on file   Tobacco Use    Smoking status: Never     Passive exposure: Yes    Smokeless tobacco: Never    Tobacco comments:     parents smoked as a child   Vaping Use    Vaping Use: Never used   Substance and Sexual Activity    Alcohol use: Yes     Alcohol/week: 0.0 standard drinks    Drug use: No    Sexual activity: Yes     Partners: Female   Other Topics Concern    Not on file   Social History Narrative    Not on file     Social Determinants of Health     Financial Resource Strain: Not on file   Food Insecurity: Not on file   Transportation Needs: Not on file   Physical Activity: Not on file   Stress: Not on file   Social Connections: Not on file   Intimate Partner Violence: Not on file   Housing Stability: Not on file     Current Medications:  No current facility-administered medications for this encounter.      Current Outpatient Medications   Medication Sig Dispense Refill    amoxicillin-clavulanate (AUGMENTIN) 875-125 MG per tablet Take 1 tablet by mouth 2 times daily for 10 days 20 tablet 0    fluticasone (FLONASE) 50 MCG/ACT nasal spray 2 sprays by Each Nostril route daily 16 g 0    atorvastatin (LIPITOR) 80 MG tablet Take one tablet by mouth daily 30 tablet 11    metoprolol succinate (TOPROL XL) 100 MG extended release tablet Take one tablet by mouth daily 30 tablet 11    losartan (COZAAR) 100 MG tablet Take one tablet by mouth daily 30 tablet 11    hydroCHLOROthiazide (HYDRODIURIL) 25 MG tablet TAKE ONE TABLET BY MOUTH DAILY 30 tablet 11    aspirin 81 MG chewable tablet CHEW AND SWALLOW 1 TABLET BY MOUTH DAILY 30 tablet 11    Blood Pressure KIT 1 kit by Does not apply route once for 1 dose 1 kit 0     Allergies:  No Known Allergies  Screenings:     [place] Coma Scale  Eye Opening: Spontaneous  Best Verbal Response: Oriented  Best Motor Response: Obeys commands  [place] Coma Scale Score: 15           [facility] Assessment  BP: (!) 128/94  Heart Rate: 99          REVIEW OF SYSTEMS  Positives and Pertinent Negatives as per HPI. PHYSICAL EXAM  BP (!) 128/94   Pulse 99   Temp 98.4 °F (36.9 °C) (Oral)   Resp 19   SpO2 96%     GENERAL APPEARANCE: Awake and alert. Cooperative. No acute distress. HEAD: Normocephalic. Atraumatic. EYES:  EOM's grossly intact. ENT: Mucous membranes are moist.   NECK: Supple. No JVD. No tracheal tenderness deviation. No crepitus. HEART: Tachycardic. No chest wall tenderness. . No murmurs, rubs, or gallops. LUNGS: CTA B. No wheezing, rhonchi, rales. Respirations unlabored. Good air exchange. ABDOMEN: Soft. Non-distended. Non-tender. No midline pulsatile mass. EXTREMITIES: No peripheral edema. No unilateral calf pain, redness or swelling. Moves all extremities equally. All extremities neurovascularly intact. SKIN: Warm and dry. No acute rashes. NEUROLOGICAL: Alert and oriented. No gross facial drooping. Strength 5/5, sensation intact. EKG  When ordered, EKG's are interpreted by the ED Physician in the absence of a Cardiologist.  Please see their note for interpretation of EKG.     LABS  Labs Reviewed   COVID-19 & INFLUENZA COMBO - Abnormal; Notable for the following components:       Result Value    SARS-CoV-2 RNA, RT PCR DETECTED (*)     All other components within normal limits   CBC WITH AUTO DIFFERENTIAL - Abnormal; Notable for the following components:    WBC 15.3 (*)     Neutrophils Absolute 9.6 (*)     All other components within normal limits   COMPREHENSIVE METABOLIC PANEL W/ REFLEX TO MG FOR LOW K - Abnormal; Notable for the following components:    Sodium 133 (*)     Chloride 93 (*)     Anion Gap 19 (*)     Glucose 264 (*)     All other components within normal limits   LACTIC ACID - Abnormal; Notable for the following components:    Lactic Acid 3.2 (*)     All other components within normal limits URINALYSIS - Abnormal; Notable for the following components:    Leukocyte Esterase, Urine TRACE (*)     All other components within normal limits   LACTIC ACID - Abnormal; Notable for the following components:    Lactic Acid 2.6 (*)     All other components within normal limits   TROPONIN   MAGNESIUM    Narrative:     ORDER WAS CANCELLED 02/19/2023 20:08, Cancelled: Added on to original order                  #[id number]. BRAIN NATRIURETIC PEPTIDE    Narrative:     ORDER WAS CANCELLED 02/19/2023 20:08, Cancelled: Added on to original order                  #[id number]. MICROSCOPIC URINALYSIS     When ordered, only abnormal lab results are displayed. All other labs were within normal range or not returned as of this dictation. RADIOLOGY  Non-plain film images such as CT, U/S, and MRI are read by the radiologist.  Plain radiographic images are visualized and preliminarily interpreted by the ED Provider with the below findings:     Stable portable chest x-ray. Interpretation per the Radiologist below, if available at the time of this note:  CT CHEST PULMONARY EMBOLISM W CONTRAST   Final Result   No evidence of pulmonary embolism or acute pulmonary abnormality. RECOMMENDATIONS:   Unavailable         XR CHEST PORTABLE   Final Result   No radiographic evidence of acute pulmonary disease. PROCEDURES  Unless otherwise noted below, none.     ED COURSE/DDx/MDM  History obtained from:  Patient    Vitals:  Vitals:    02/19/23 2200 02/19/23 2215 02/19/23 2346 02/20/23 0006   BP:  132/85 (!) 128/94    Pulse: (!) 107 (!) 108 (!) 106 99   Resp: 14 14 13 19   Temp:       TempSrc:       SpO2: 97% 97% 97% 96%     Patient received following medications in ED:  Medications   0.9 % sodium chloride bolus (0 mLs IntraVENous Stopped 2/19/23 2302)   iopamidol (ISOVUE-370) 76 % injection 75 mL (75 mLs IntraVENous Given 2/19/23 2137)   amoxicillin-clavulanate (AUGMENTIN) 875-125 MG per tablet 1 tablet (1 tablet Oral Given 2/20/23 0015)     Sepsis Consideration:  Is this patient to be included in the SEP-1 Core Measure due to severe sepsis or septic shock? No   Exclusion criteria - the patient is NOT to be included for SEP-1 Core Measure due to: Infection is not suspected    Records Reviewed:   Cardiology note reviewed from 2/16/2023 at which point plan was for outpatient echocardiogram.  Patient in fact has been diagnosed with CAD. Has come down from 1 teens to about 105. CBC does show a . Chronic conditions affecting care:   CAD and hypertension. has no past medical history on file. Social Determinants:   Does not have PCP. Consults:   None    Reassessment:      Patient was given a 1 L IV fluid bolus. Has remained somewhat tachycardic despite. MDM/Disposition Considerations:   Briefly Jonnie Handley presents for Leukocytosis at 15.3. No bandemia. Does have a lactate of 3.2. Normal magnesium. BNP within normal limits and troponin less than 0.01. Urinalysis without evidence for infectious process. Stable portable chest x-ray. EKG without evidence for ischemic change. Given persistent tachycardia and symptomology did obtain CT PE study negative for pulmonary embolus or other acute pathologic process. Repeat lactate 2.6. Patient initially refused COVID swab although after discussion he did agree to have that done. COVID was positive. Think this is most probably cause for patient's leukocytosis. Do suspect that he is also mildly dehydrated. No evidence for bacterial source at this point. Patient was offered admission for tachycardia although reports that he does feel comfortable with discharge home. We discussed return precautions as well as recommendations for follow-up otherwise and patient in agreement and comfortable at discharge. .    Critical Care Time:   30 Minutes of critical care time spent not including separately billable procedures.     DDx:  I estimate there is LOW risk for ACUTE CORONARY SYNDROME, INTRACRANIAL HEMORRHAGE, MALIGNANT DYSRHYTHMIA or HYPERTENSION, PULMONARY EMBOLISM, SEPSIS, SUBARACHNOID HEMORRHAGE, SUBDURAL HEMATOMA, STROKE, or THORACIC AORTIC DISSECTION, thus I consider the discharge disposition reasonable. Megan Hope and I have also discussed returning to the Emergency Department immediately if new or worsening symptoms occur. We have discussed the symptoms which are most concerning (e.g., bloody sputum, fever, worsening pain or shortness of breath, vomiting, weakness) that necessitate immediate return. FINAL IMPRESSION  1. COVID    2. Acute sinusitis, recurrence not specified, unspecified location      Patient referred to:  Jian Hernandez MD  [address] [place] [address] [address]  [place] [id number] [facility]  [phone]    Schedule an appointment as soon as possible for a visit       [facility]  ED  [facility]  [address] [phone]    If symptoms worsen    Discharge Medications:   Discharge Medication List as of 2/20/2023 12:12 AM        START taking these medications    Details   amoxicillin-clavulanate (AUGMENTIN) 875-125 MG per tablet Take 1 tablet by mouth 2 times daily for 10 days, Disp-20 tablet, R-0Normal      fluticasone (FLONASE) 50 MCG/ACT nasal spray 2 sprays by Each Nostril route daily, Disp-16 g, R-0Normal           Discontinued Medications:  Discharge Medication List as of 2/20/2023 12:12 AM        Risk management discussed and shared decision making had with patient and/or surrogate. All questions were answered. Patient will follow up with PCP and cardiologist within 2 to 3 days. For further evaluation/treatment. All questions answered. Patient will return to ED for new/worsening symptoms. DISPOSITION:  Patient was discharged home in stable condition. .    (Please note that portions of this note were completed with a voice recognition program.  Efforts were made to edit the dictations but occasionally words are mis-transcribed).          Yesenia [facility] [place]  02/20/23 4280

## 2023-05-05 ENCOUNTER — HOSPITAL ENCOUNTER (OUTPATIENT)
Dept: NON INVASIVE DIAGNOSTICS | Age: 58
Discharge: HOME | End: 2023-05-05
Payer: COMMERCIAL

## 2023-05-05 ENCOUNTER — TELEPHONE (OUTPATIENT)
Dept: CARDIOLOGY CLINIC | Age: 58
End: 2023-05-05

## 2023-05-05 DIAGNOSIS — R94.31 ABNORMAL EKG: ICD-10-CM

## 2023-05-05 DIAGNOSIS — I25.10 CORONARY ARTERY DISEASE INVOLVING NATIVE CORONARY ARTERY OF NATIVE HEART WITHOUT ANGINA PECTORIS: ICD-10-CM

## 2023-05-05 LAB
LV EF: 58 %
LVEF MODALITY: NORMAL

## 2023-05-05 PROCEDURE — 93306 TTE W/DOPPLER COMPLETE: CPT

## 2023-05-05 NOTE — TELEPHONE ENCOUNTER
Created telephone encounter. Spoke with Mena Arnold relayed message per VSP regarding echo. Pt  wife verbalized understanding.

## 2023-05-05 NOTE — TELEPHONE ENCOUNTER
----- Message from Marissa Musa MD sent at 5/5/2023 12:42 PM EDT -----  There are minor findings that are noticed on the testing. The overall findings suggest normal age related results, but there are degree of changes that will need to be monitored. Please call patient with results.

## 2024-02-19 DIAGNOSIS — E78.2 MIXED HYPERLIPIDEMIA: ICD-10-CM

## 2024-02-19 DIAGNOSIS — I25.10 CORONARY ARTERY DISEASE INVOLVING NATIVE CORONARY ARTERY OF NATIVE HEART WITHOUT ANGINA PECTORIS: ICD-10-CM

## 2024-02-19 NOTE — TELEPHONE ENCOUNTER
Last ov: 2/16/23 VSP  Upcoming ov: no upcoming    BMP-2/19/23  CBC- 2/19/23  Lipid- 2/3/23      Front- please call pt for yearly ov and to get fasting labs done, labs ordered

## 2024-02-19 NOTE — TELEPHONE ENCOUNTER
02/19 attempted to contact pt [phone] (Mobile)  to schedule OV VSP, mailbox full, unable to LVM. Will call again at a later time.

## 2024-02-21 RX ORDER — METOPROLOL SUCCINATE 100 MG/1
TABLET, EXTENDED RELEASE ORAL
Qty: 30 TABLET | Refills: 3 | Status: SHIPPED | OUTPATIENT
Start: 2024-02-21

## 2024-02-21 RX ORDER — ASPIRIN 81 MG/1
TABLET, CHEWABLE ORAL
Qty: 30 TABLET | Refills: 3 | Status: SHIPPED | OUTPATIENT
Start: 2024-02-21

## 2024-02-21 RX ORDER — HYDROCHLOROTHIAZIDE 25 MG/1
TABLET ORAL
Qty: 30 TABLET | Refills: 3 | Status: SHIPPED | OUTPATIENT
Start: 2024-02-21

## 2024-02-21 RX ORDER — ATORVASTATIN CALCIUM 80 MG/1
TABLET, FILM COATED ORAL
Qty: 30 TABLET | Refills: 3 | Status: SHIPPED | OUTPATIENT
Start: 2024-02-21

## 2024-02-21 RX ORDER — LOSARTAN POTASSIUM 100 MG/1
TABLET ORAL
Qty: 30 TABLET | Refills: 3 | Status: SHIPPED | OUTPATIENT
Start: 2024-02-21

## 2024-06-26 DIAGNOSIS — E78.2 MIXED HYPERLIPIDEMIA: ICD-10-CM

## 2024-06-26 DIAGNOSIS — I25.10 CORONARY ARTERY DISEASE INVOLVING NATIVE CORONARY ARTERY OF NATIVE HEART WITHOUT ANGINA PECTORIS: ICD-10-CM

## 2024-06-26 RX ORDER — ASPIRIN 81 MG/1
TABLET, CHEWABLE ORAL
Qty: 30 TABLET | Refills: 1 | Status: SHIPPED | OUTPATIENT
Start: 2024-06-26

## 2024-06-26 RX ORDER — ATORVASTATIN CALCIUM 80 MG/1
80 TABLET, FILM COATED ORAL DAILY
Qty: 30 TABLET | Refills: 1 | Status: SHIPPED | OUTPATIENT
Start: 2024-06-26

## 2024-06-26 RX ORDER — LOSARTAN POTASSIUM 100 MG/1
100 TABLET ORAL DAILY
Qty: 30 TABLET | Refills: 1 | Status: SHIPPED | OUTPATIENT
Start: 2024-06-26

## 2024-06-26 RX ORDER — HYDROCHLOROTHIAZIDE 25 MG/1
25 TABLET ORAL DAILY
Qty: 30 TABLET | Refills: 1 | Status: SHIPPED | OUTPATIENT
Start: 2024-06-26

## 2024-06-26 RX ORDER — METOPROLOL SUCCINATE 100 MG/1
100 TABLET, EXTENDED RELEASE ORAL DAILY
Qty: 30 TABLET | Refills: 1 | Status: SHIPPED | OUTPATIENT
Start: 2024-06-26

## 2024-06-26 NOTE — TELEPHONE ENCOUNTER
Last ov-2/16/23 VSP   Upcoming ov-8/22/24 VSP    Lipid-2/3/23  Cmp-2/19/23  EKG-2/16/23    Called patient to remind him that labs were ordered in February and to get those completed. Pt v/u

## 2024-06-26 NOTE — TELEPHONE ENCOUNTER
Pts wife stated that the prescriptions need to go to     [facility] [id number] - [place] [address] [address] [phone]

## 2024-06-26 NOTE — TELEPHONE ENCOUNTER
Pts wife is requesting refills of Asprin 81mg, Losartan 100mg, Hydrochlorothiazide 25mg, Lipitor 80mg, Metoprolol Succinate 100mg. Pt will be out of medication on 6/27.Preferred pharmacy is    [facility] [id number] [place] [address] [address] - [place] [phone]     Last ov 02/16/2023 vsp  Next ov 08/22/2024 vsp

## 2024-06-27 NOTE — TELEPHONE ENCOUNTER
Called [facility] pharmacy, they will call [facility] to get rx's moved to their pharmacy.     Attempted to reach pt, left vm to call office back.  No updated HIPAA form.

## 2024-08-17 ENCOUNTER — HOSPITAL ENCOUNTER (OUTPATIENT)
Age: 59
Discharge: HOME | End: 2024-08-17

## 2024-08-17 DIAGNOSIS — I25.10 CORONARY ARTERY DISEASE INVOLVING NATIVE CORONARY ARTERY OF NATIVE HEART WITHOUT ANGINA PECTORIS: ICD-10-CM

## 2024-08-17 DIAGNOSIS — E78.2 MIXED HYPERLIPIDEMIA: ICD-10-CM

## 2024-08-17 LAB
ANION GAP SERPL CALCULATED.3IONS-SCNC: 15 MMOL/L (ref 3–16)
BASOPHILS # BLD: 0.1 K/UL (ref 0–0.2)
BASOPHILS NFR BLD: 0.6 %
BUN SERPL-MCNC: 14 MG/DL (ref 7–20)
CALCIUM SERPL-MCNC: 9.7 MG/DL (ref 8.3–10.6)
CHLORIDE SERPL-SCNC: 97 MMOL/L (ref 99–110)
CHOLEST SERPL-MCNC: 170 MG/DL (ref 0–199)
CO2 SERPL-SCNC: 25 MMOL/L (ref 21–32)
CREAT SERPL-MCNC: 1.1 MG/DL (ref 0.9–1.3)
DEPRECATED RDW RBC AUTO: 13.6 % (ref 12.4–15.4)
EOSINOPHIL # BLD: 0.1 K/UL (ref 0–0.6)
EOSINOPHIL NFR BLD: 0.8 %
GFR SERPLBLD CREATININE-BSD FMLA CKD-EPI: 78 ML/MIN/{1.73_M2}
GLUCOSE SERPL-MCNC: 243 MG/DL (ref 70–99)
HCT VFR BLD AUTO: 52.1 % (ref 40.5–52.5)
HDLC SERPL-MCNC: 31 MG/DL (ref 40–60)
HGB BLD-MCNC: 18 G/DL (ref 13.5–17.5)
LDL CHOLESTEROL: 85 MG/DL
LYMPHOCYTES # BLD: 3.3 K/UL (ref 1–5.1)
LYMPHOCYTES NFR BLD: 29.1 %
MCH RBC QN AUTO: 30.8 PG (ref 26–34)
MCHC RBC AUTO-ENTMCNC: 34.6 G/DL (ref 31–36)
MCV RBC AUTO: 88.8 FL (ref 80–100)
MONOCYTES # BLD: 0.6 K/UL (ref 0–1.3)
MONOCYTES NFR BLD: 5.3 %
NEUTROPHILS # BLD: 7.3 K/UL (ref 1.7–7.7)
NEUTROPHILS NFR BLD: 64.2 %
PLATELET # BLD AUTO: 220 K/UL (ref 135–450)
PMV BLD AUTO: 11 FL (ref 5–10.5)
POTASSIUM SERPL-SCNC: 4 MMOL/L (ref 3.5–5.1)
RBC # BLD AUTO: 5.87 M/UL (ref 4.2–5.9)
SODIUM SERPL-SCNC: 137 MMOL/L (ref 136–145)
TRIGL SERPL-MCNC: 269 MG/DL (ref 0–150)
VLDLC SERPL CALC-MCNC: 54 MG/DL
WBC # BLD AUTO: 11.4 K/UL (ref 4–11)

## 2024-08-22 ENCOUNTER — OFFICE VISIT (OUTPATIENT)
Dept: CARDIOLOGY CLINIC | Age: 59
End: 2024-08-22
Payer: COMMERCIAL

## 2024-08-22 VITALS
HEIGHT: 70 IN | DIASTOLIC BLOOD PRESSURE: 78 MMHG | SYSTOLIC BLOOD PRESSURE: 136 MMHG | BODY MASS INDEX: 33.28 KG/M2 | OXYGEN SATURATION: 98 % | WEIGHT: 232.5 LBS | HEART RATE: 103 BPM

## 2024-08-22 DIAGNOSIS — I25.10 CORONARY ARTERY DISEASE INVOLVING NATIVE CORONARY ARTERY OF NATIVE HEART WITHOUT ANGINA PECTORIS: Primary | ICD-10-CM

## 2024-08-22 DIAGNOSIS — E11.9 TYPE 2 DIABETES MELLITUS WITHOUT COMPLICATION, WITHOUT LONG-TERM CURRENT USE OF INSULIN (HCC): ICD-10-CM

## 2024-08-22 DIAGNOSIS — E78.2 MIXED HYPERLIPIDEMIA: ICD-10-CM

## 2024-08-22 DIAGNOSIS — Z79.899 MEDICATION MANAGEMENT: ICD-10-CM

## 2024-08-22 PROCEDURE — 2022F DILAT RTA XM EVC RTNOPTHY: CPT | Performed by: INTERNAL MEDICINE

## 2024-08-22 PROCEDURE — 3075F SYST BP GE 130 - 139MM HG: CPT | Performed by: INTERNAL MEDICINE

## 2024-08-22 PROCEDURE — 93000 ELECTROCARDIOGRAM COMPLETE: CPT | Performed by: INTERNAL MEDICINE

## 2024-08-22 PROCEDURE — 3046F HEMOGLOBIN A1C LEVEL >9.0%: CPT | Performed by: INTERNAL MEDICINE

## 2024-08-22 PROCEDURE — 3017F COLORECTAL CA SCREEN DOC REV: CPT | Performed by: INTERNAL MEDICINE

## 2024-08-22 PROCEDURE — G8427 DOCREV CUR MEDS BY ELIG CLIN: HCPCS | Performed by: INTERNAL MEDICINE

## 2024-08-22 PROCEDURE — G8417 CALC BMI ABV UP PARAM F/U: HCPCS | Performed by: INTERNAL MEDICINE

## 2024-08-22 PROCEDURE — 3078F DIAST BP <80 MM HG: CPT | Performed by: INTERNAL MEDICINE

## 2024-08-22 PROCEDURE — 99214 OFFICE O/P EST MOD 30 MIN: CPT | Performed by: INTERNAL MEDICINE

## 2024-08-22 PROCEDURE — 1036F TOBACCO NON-USER: CPT | Performed by: INTERNAL MEDICINE

## 2024-08-22 RX ORDER — LOSARTAN POTASSIUM 100 MG/1
100 TABLET ORAL DAILY
Qty: 90 TABLET | Refills: 3 | Status: SHIPPED | OUTPATIENT
Start: 2024-08-22

## 2024-08-22 RX ORDER — METOPROLOL SUCCINATE 100 MG/1
100 TABLET, EXTENDED RELEASE ORAL DAILY
Qty: 90 TABLET | Refills: 3 | Status: SHIPPED | OUTPATIENT
Start: 2024-08-22

## 2024-08-22 RX ORDER — METFORMIN HYDROCHLORIDE 500 MG/1
500 TABLET, EXTENDED RELEASE ORAL
Qty: 90 TABLET | Refills: 1 | Status: SHIPPED | OUTPATIENT
Start: 2024-08-22

## 2024-08-22 RX ORDER — HYDROCHLOROTHIAZIDE 25 MG/1
25 TABLET ORAL DAILY
Qty: 90 TABLET | Refills: 3 | Status: SHIPPED | OUTPATIENT
Start: 2024-08-22

## 2024-08-22 RX ORDER — ATORVASTATIN CALCIUM 80 MG/1
80 TABLET, FILM COATED ORAL DAILY
Qty: 90 TABLET | Refills: 3 | Status: SHIPPED | OUTPATIENT
Start: 2024-08-22

## 2024-08-22 RX ORDER — ASPIRIN 81 MG/1
TABLET, CHEWABLE ORAL
Qty: 90 TABLET | Refills: 3 | Status: SHIPPED | OUTPATIENT
Start: 2024-08-22

## 2024-08-22 NOTE — PROGRESS NOTES
[facility]   Cardiovascular Evaluation    PATIENT: Ted Ramirez  DATE: 2024  MRN: [id number]  CSN: [id number]  : 1965    Primary Care Doctor: No primary care provider on file.    Reason for evaluation:   Follow-up, Hypertension, Hyperlipidemia, and Coronary Artery Disease      Subjective:     History of present illness:   Ted Ramirez is a 58 y.o. male patient who presents for cardiology follow up for coronary artery disease, hypertension, and hyperlipidemia. He underwent left heart catheterization 2011 which revealed mild, diffuse epicardial coronary artery disease. Limited echocardiogram 11 EF 60%, mildly dilated left atrium.    Since last office visit echocardiogram 2023 EF 55-60%, mild LVH, Grade I DD.    Today he reports he has been stressed in relation to his job. He has cut back on his alcohol consumption. He does not drink throughout week anymore. He walks regularly at work and has sweat. He has lost 16-17 pounds in last year. Relates to cutting back on alcohol and sweating at work. Patient currently denies any weight gain, edema, palpitations, chest pain, shortness of breath, dizziness, and syncope. He is taking medications as prescribed, tolerating well. He does not have a PCP.       Patient Active Problem List   Diagnosis    Other chest pain    Essential hypertension, benign    History of non-ST elevation myocardial infarction (NSTEMI)    CAD (coronary artery disease)    Mixed hyperlipidemia    Alcohol use    Abnormal EKG    Type 2 diabetes mellitus without complication, without long-term current use of insulin ([facility])     Cardiac Testing: I have reviewed the findings below.   EKG:   ECHO: CONCLUSION-  1. Limited 2D echocardiogram.  2. Normal left ventricular chamber size with preserved left  ventricular function and ejection fraction of 60%.  3. Diastolic noncompliance.  4. Mildly dilated left atrium.  5. Borderline dilated right ventricle

## 2024-08-22 NOTE — PATIENT INSTRUCTIONS
Plan:  Reviewed lab ~ elevated blood glucose as well as (hemoglobin A1c 9.3) diabetes mellitus   Recommend establishing care with primary care provider in next 2-3 months ~ diabetes management   Start metformin 500 mg daily with breakfast ~ diabetes management  BMP in 2 weeks ~ assess kidney function and glucose  Follow up in 2-3 months

## 2024-08-26 DIAGNOSIS — E78.2 MIXED HYPERLIPIDEMIA: ICD-10-CM

## 2024-08-26 DIAGNOSIS — I25.10 CORONARY ARTERY DISEASE INVOLVING NATIVE CORONARY ARTERY OF NATIVE HEART WITHOUT ANGINA PECTORIS: ICD-10-CM

## 2024-08-26 NOTE — TELEPHONE ENCOUNTER
Last ov: 8/22/24 VSP  Upcoming ov: Follow up in 2-3 months     Labs- LIPID - 8/17/24  BMP/CBC 8/17/24     Front- can you schedule ov for pt please, thank you!

## 2024-08-27 RX ORDER — LOSARTAN POTASSIUM 100 MG/1
100 TABLET ORAL DAILY
Qty: 90 TABLET | Refills: 3 | Status: SHIPPED | OUTPATIENT
Start: 2024-08-27

## 2024-08-27 RX ORDER — ASPIRIN 81 MG/1
TABLET, CHEWABLE ORAL
Qty: 90 TABLET | Refills: 3 | Status: SHIPPED | OUTPATIENT
Start: 2024-08-27

## 2024-08-27 RX ORDER — HYDROCHLOROTHIAZIDE 25 MG/1
25 TABLET ORAL DAILY
Qty: 90 TABLET | Refills: 3 | Status: SHIPPED | OUTPATIENT
Start: 2024-08-27

## 2024-08-27 RX ORDER — ATORVASTATIN CALCIUM 80 MG/1
80 TABLET, FILM COATED ORAL DAILY
Qty: 90 TABLET | Refills: 3 | Status: SHIPPED | OUTPATIENT
Start: 2024-08-27

## 2024-08-27 RX ORDER — METOPROLOL SUCCINATE 100 MG/1
100 TABLET, EXTENDED RELEASE ORAL DAILY
Qty: 90 TABLET | Refills: 3 | Status: SHIPPED | OUTPATIENT
Start: 2024-08-27

## 2025-02-25 DIAGNOSIS — I25.10 CORONARY ARTERY DISEASE INVOLVING NATIVE CORONARY ARTERY OF NATIVE HEART WITHOUT ANGINA PECTORIS: ICD-10-CM

## 2025-02-25 DIAGNOSIS — E11.9 TYPE 2 DIABETES MELLITUS WITHOUT COMPLICATION, WITHOUT LONG-TERM CURRENT USE OF INSULIN (HCC): ICD-10-CM

## 2025-03-03 ENCOUNTER — TELEPHONE (OUTPATIENT)
Dept: CARDIOLOGY CLINIC | Age: 60
End: 2025-03-03

## 2025-03-03 DIAGNOSIS — I25.10 CORONARY ARTERY DISEASE INVOLVING NATIVE CORONARY ARTERY OF NATIVE HEART WITHOUT ANGINA PECTORIS: ICD-10-CM

## 2025-03-03 DIAGNOSIS — E11.9 TYPE 2 DIABETES MELLITUS WITHOUT COMPLICATION, WITHOUT LONG-TERM CURRENT USE OF INSULIN: ICD-10-CM

## 2025-03-03 RX ORDER — METFORMIN HYDROCHLORIDE 500 MG/1
500 TABLET, EXTENDED RELEASE ORAL DAILY
Qty: 30 TABLET | OUTPATIENT
Start: 2025-03-03

## 2025-03-03 NOTE — TELEPHONE ENCOUNTER
Pts wife Cristina stated that they are still trying to get into a pcp, pt recently lost job and they are just now getting insurance back. They would like to know if vsp can prescribe Metformin until he can be seen by a pcp. Cristina stated that vsp has previously prescribed. Pt has one dose left of metformin. If we are able to preferred pharmacy is    [facility] [id number] - [place], [place] - [facility] [address] - [place] [phone]     Last ov 08/22/2024 jules

## 2025-03-03 NOTE — TELEPHONE ENCOUNTER
Called and spoke to patient spouse patient was started on medication by VSP will need BMP prior to filling, he will have completed 03/04/25. Will keep on board for refill once results reviewed by VSP.

## 2025-03-06 ENCOUNTER — HOSPITAL ENCOUNTER (OUTPATIENT)
Age: 60
Discharge: HOME | End: 2025-03-06
Payer: MEDICAID

## 2025-03-06 DIAGNOSIS — Z79.899 MEDICATION MANAGEMENT: ICD-10-CM

## 2025-03-06 LAB
ANION GAP SERPL CALCULATED.3IONS-SCNC: 14 MMOL/L (ref 3–16)
BUN SERPL-MCNC: 18 MG/DL (ref 7–20)
CALCIUM SERPL-MCNC: 9.6 MG/DL (ref 8.3–10.6)
CHLORIDE SERPL-SCNC: 95 MMOL/L (ref 99–110)
CO2 SERPL-SCNC: 24 MMOL/L (ref 21–32)
CREAT SERPL-MCNC: 1 MG/DL (ref 0.9–1.3)
GFR SERPLBLD CREATININE-BSD FMLA CKD-EPI: 87 ML/MIN/{1.73_M2}
GLUCOSE SERPL-MCNC: 262 MG/DL (ref 70–99)
POTASSIUM SERPL-SCNC: 3.9 MMOL/L (ref 3.5–5.1)
SODIUM SERPL-SCNC: 133 MMOL/L (ref 136–145)

## 2025-03-06 PROCEDURE — 80048 BASIC METABOLIC PNL TOTAL CA: CPT

## 2025-03-06 PROCEDURE — 36415 COLL VENOUS BLD VENIPUNCTURE: CPT

## 2025-03-07 RX ORDER — METFORMIN HYDROCHLORIDE 500 MG/1
500 TABLET, EXTENDED RELEASE ORAL
Qty: 90 TABLET | Refills: 0 | Status: SHIPPED | OUTPATIENT
Start: 2025-03-07

## 2025-06-10 DIAGNOSIS — E11.9 TYPE 2 DIABETES MELLITUS WITHOUT COMPLICATION, WITHOUT LONG-TERM CURRENT USE OF INSULIN (HCC): ICD-10-CM

## 2025-06-10 DIAGNOSIS — I25.10 CORONARY ARTERY DISEASE INVOLVING NATIVE CORONARY ARTERY OF NATIVE HEART WITHOUT ANGINA PECTORIS: ICD-10-CM

## 2025-06-12 RX ORDER — METFORMIN HYDROCHLORIDE 500 MG/1
500 TABLET, EXTENDED RELEASE ORAL DAILY
Qty: 30 TABLET | Refills: 0 | Status: SHIPPED | OUTPATIENT
Start: 2025-06-12

## 2025-06-12 NOTE — TELEPHONE ENCOUNTER
Spoke with pts spouse, relayed we cannot continue this medication refill as pt was advised to see a PCP 2-3 months after LOV per VSP. Relayed VSP did initiate the medication but as cardiology we do not continue. Relayed between August and now pt should have seen a PCP/Endocrine for diabetic care, she v/u    VSP 30 days pending while pt finds PCP, left note to pharmacy \"Patient needs to see PCP for refills, Cardiology will no longer refill\"

## 2025-06-27 DIAGNOSIS — I25.10 CORONARY ARTERY DISEASE INVOLVING NATIVE CORONARY ARTERY OF NATIVE HEART WITHOUT ANGINA PECTORIS: ICD-10-CM

## 2025-06-27 RX ORDER — ASPIRIN 81 MG
81 TABLET,CHEWABLE ORAL
Qty: 90 TABLET | Refills: 0 | Status: SHIPPED | OUTPATIENT
Start: 2025-06-27

## 2025-06-27 NOTE — TELEPHONE ENCOUNTER
Last Office Visit: 8/22/2024 Provider: LEOLA  **Is provider OOT? Yes    Next Office Visit: 8/21/2025 Provider: LEOLA      LAST LABS:   BMP:  Lab Results   Component Value Date/Time     03/06/2025 01:47 PM    K 3.9 03/06/2025 01:47 PM    K 3.7 02/19/2023 06:32 PM    CL 95 03/06/2025 01:47 PM    CO2 24 03/06/2025 01:47 PM    BUN 18 03/06/2025 01:47 PM    CREATININE 1.0 03/06/2025 01:47 PM    GLUCOSE 262 03/06/2025 01:47 PM    CALCIUM 9.6 03/06/2025 01:47 PM    LABGLOM 87 03/06/2025 01:47 PM    LABGLOM >60 02/19/2023 06:32 PM      Lab orders needed? no

## 2025-08-01 NOTE — TELEPHONE ENCOUNTER
Pt scheduled 2/05 with VSP. Wife states pt usually get labs done before OVV. Does pt need labs. Please advise. Let wife know.  TY No